# Patient Record
Sex: MALE | Race: WHITE | NOT HISPANIC OR LATINO | Employment: OTHER | URBAN - METROPOLITAN AREA
[De-identification: names, ages, dates, MRNs, and addresses within clinical notes are randomized per-mention and may not be internally consistent; named-entity substitution may affect disease eponyms.]

---

## 2017-08-10 ENCOUNTER — TELEPHONE (OUTPATIENT)
Dept: FAMILY MEDICINE | Facility: CLINIC | Age: 77
End: 2017-08-10

## 2017-08-10 DIAGNOSIS — I10 BENIGN ESSENTIAL HYPERTENSION: Primary | ICD-10-CM

## 2017-08-10 RX ORDER — METOPROLOL SUCCINATE 50 MG/1
50 TABLET, EXTENDED RELEASE ORAL DAILY
Qty: 90 TABLET | Refills: 3 | Status: SHIPPED | OUTPATIENT
Start: 2017-08-10 | End: 2018-04-05

## 2017-08-10 NOTE — TELEPHONE ENCOUNTER
Reason for Call:  Medication or medication refill:    Do you use a [facility]?  Name of the pharmacy and phone number for the current request:    [facility] [address] [place] [address] & [facility].    Name of the medication requested: atenolol (TENORMIN) 50 MG tablet    Other request: this medication is on back order.  Pt. Is out of medication, pharmacy said they have reached out to clinic to   Get a script for another medication but have not heard back.  Please send a script for a new medication to replace this medication.    Can we leave a detailed message on this number? YES    Phone number patient can be reached at: Home number on file [phone] (home)    Best Time: ASAP    Call taken on 8/10/2017 at 1:41 PM by Gertrudis Estrella    .

## 2017-08-10 NOTE — TELEPHONE ENCOUNTER
Prescritpion for metoprolol sent  Have him schedule follow up appointment with PCP for blood pressure recheck after medication change

## 2017-10-27 DIAGNOSIS — I10 BENIGN ESSENTIAL HYPERTENSION: ICD-10-CM

## 2017-10-27 RX ORDER — LISINOPRIL AND HYDROCHLOROTHIAZIDE 12.5; 2 MG/1; MG/1
TABLET ORAL
Qty: 30 TABLET | Refills: 0 | Status: SHIPPED | OUTPATIENT
Start: 2017-10-27 | End: 2017-11-22

## 2017-10-27 NOTE — TELEPHONE ENCOUNTER
Medication is being filled for 1 time refill only due to:  Patient needs to be seen because it has been more than one year since last visit.   Due for annual apt.  Tessa Pruitt RN

## 2017-10-30 RX ORDER — LISINOPRIL AND HYDROCHLOROTHIAZIDE 12.5; 2 MG/1; MG/1
TABLET ORAL
Start: 2017-10-30

## 2017-10-30 NOTE — TELEPHONE ENCOUNTER
Pharmacy requesting 90 day supply  Denied  90 day supply not appropriate; due for physical  Guadalupe BRANDON RN

## 2017-11-05 DIAGNOSIS — I10 BENIGN ESSENTIAL HYPERTENSION: ICD-10-CM

## 2017-11-06 NOTE — TELEPHONE ENCOUNTER
Spoke with patient. Cannot be seen until end of Jan. Scheduled physical for Mon Jan 29 at 8am. Patient requesting 3 months of rx to last until appointment      LOV: 10/28/2016

## 2017-11-07 DIAGNOSIS — I10 BENIGN ESSENTIAL HYPERTENSION: ICD-10-CM

## 2017-11-07 RX ORDER — ATENOLOL 50 MG/1
TABLET ORAL
Qty: 60 TABLET | Refills: 0 | Status: SHIPPED | OUTPATIENT
Start: 2017-11-07 | End: 2018-01-04

## 2017-11-09 RX ORDER — ATENOLOL 50 MG/1
TABLET ORAL
Start: 2017-11-09

## 2017-11-09 NOTE — TELEPHONE ENCOUNTER
Pharmacy requesting 90 day supply  Rx sent 11/7/2017 for 30 day  Denied  90 day supply not appropriate - due for appt  Guadalupe BRANDON RN

## 2017-11-22 DIAGNOSIS — I10 BENIGN ESSENTIAL HYPERTENSION: ICD-10-CM

## 2017-11-22 RX ORDER — LISINOPRIL AND HYDROCHLOROTHIAZIDE 12.5; 2 MG/1; MG/1
TABLET ORAL
Qty: 90 TABLET | Refills: 0 | Status: SHIPPED | OUTPATIENT
Start: 2017-11-22 | End: 2018-02-19

## 2017-11-22 NOTE — TELEPHONE ENCOUNTER
Requested Prescriptions   Pending Prescriptions Disp Refills     lisinopril-hydrochlorothiazide (PRINZIDE/ZESTORETIC) 20-12.5 MG per tablet [Pharmacy Med Name: LISINOPRIL-HCTZ 20/12.5MG TABLETS] 30 tablet 0     Sig: TAKE 1 TABLET BY MOUTH DAILY    Diuretics (Including Combos) Protocol Failed    11/22/2017  1:35 PM       Failed - Blood pressure under 140/90    BP Readings from Last 3 Encounters:   10/28/16 138/82   09/11/15 138/86   05/22/14 156/89          Failed - Recent or future visit with authorizing provider's specialty    Patient had office visit in the last year or has a visit in the next 30 days with authorizing provider.  See chart review.          Failed - Normal serum creatinine on file in past 12 months    Recent Labs   Lab Test  10/28/16   1052   CR  0.82             Failed - Normal serum potassium on file in past 12 months    Recent Labs   Lab Test  10/28/16   1052   POTASSIUM  3.7          Failed - Normal serum sodium on file in past 12 months    Recent Labs   Lab Test  10/28/16   1052   NA  136           Passed - Patient is age 18 or older        Next 5 appointments (look out 90 days)     Jan 29, 2018  8:00 AM CST   [facility]    [address] [facility] [address] [phone]                Medication is being filled for 1 time refill only due to:  Patient needs to be seen because it has been more than one year since last visit.   Aimee LAMAR RN

## 2018-01-04 DIAGNOSIS — I10 BENIGN ESSENTIAL HYPERTENSION: ICD-10-CM

## 2018-01-05 RX ORDER — ATENOLOL 50 MG/1
TABLET ORAL
Qty: 60 TABLET | Refills: 0 | Status: SHIPPED | OUTPATIENT
Start: 2018-01-05 | End: 2018-02-05

## 2018-01-05 NOTE — TELEPHONE ENCOUNTER
Medication is being filled for 1 time refill only due to:  upcoming appt   Guadalupe BRANDON RN    Requested Prescriptions   Pending Prescriptions Disp Refills     atenolol (TENORMIN) 50 MG tablet [Pharmacy Med Name: ATENOLOL 50MG TABLETS] 60 tablet 0     Sig: TAKE 1 TABLET BY MOUTH TWICE DAILY    Beta-Blockers Protocol Failed    1/4/2018 11:46 AM       Failed - Blood pressure under 140/90    BP Readings from Last 3 Encounters:   10/28/16 138/82   09/11/15 138/86   05/22/14 156/89                Passed - Patient is age 6 or older       Passed - Recent or future visit with authorizing provider's specialty    Patient had office visit in the last year or has a visit in the next 30 days with authorizing provider.  See chart review.               Next 5 appointments (look out 90 days)     Jan 29, 2018  8:00 AM CST   PHYSICAL with Madi Huynh MD   [facility]    [address] [facility] [id number] [phone]

## 2018-02-05 DIAGNOSIS — I10 BENIGN ESSENTIAL HYPERTENSION: ICD-10-CM

## 2018-02-05 NOTE — TELEPHONE ENCOUNTER
"atenolol (TENORMIN) 50 MG tablet 60 tablet 0 1/5/2018         Last Written Prescription Date:  01/05/2018  Last Fill Quantity: 60,  # refills: 0   Last Office Visit with [facility] provider:  10/28/2016   Future Office Visit:  04/05/2018  Next 5 appointments (look out 90 days)     Apr 05, 2018 11:00 AM CDT   PHYSICAL with Madi Huynh MD   [facility]    [address] [facility] [id number] [phone]                 Requested Prescriptions   Pending Prescriptions Disp Refills     atenolol (TENORMIN) 50 MG tablet [Pharmacy Med Name: ATENOLOL 50MG TABLETS] 60 tablet 0     Sig: TAKE 1 TABLET BY MOUTH TWICE DAILY    Beta-Blockers Protocol Failed    2/5/2018 12:34 PM       Failed - Blood pressure under 140/90    BP Readings from Last 3 Encounters:   10/28/16 138/82   09/11/15 138/86   05/22/14 156/89                Failed - Recent or future visit with authorizing provider's specialty    Patient had office visit in the last year or has a visit in the next 30 days with authorizing provider.  See \"Patient Info\" tab in inbasket, or \"Choose Columns\" in Meds & Orders section of the refill encounter.            Passed - Patient is age 6 or older          "

## 2018-02-06 RX ORDER — ATENOLOL 50 MG/1
TABLET ORAL
Qty: 60 TABLET | Refills: 0 | Status: SHIPPED | OUTPATIENT
Start: 2018-02-06 | End: 2018-03-08

## 2018-02-06 NOTE — TELEPHONE ENCOUNTER
Routing refill request to provider for review/approval because:  Jodie given x1 and patient did not follow up, please advise  Patient needs to be seen because it has been more than 1 year since last office visit.  Pt does have appt in April

## 2018-02-19 DIAGNOSIS — I10 BENIGN ESSENTIAL HYPERTENSION: ICD-10-CM

## 2018-02-19 NOTE — TELEPHONE ENCOUNTER
"  lisinopril-hydrochlorothiazide (PRINZIDE/ZESTORETIC) 20-12.5 MG per tablet 90 tablet 0 11/22/2017       Last Written Prescription Date:  11/22/2017  Last Fill Quantity: 90,  # refills: 0   Last office visit: 10/28/2016 with prescribing provider:     Future Office Visit: 04/05/2018  Next 5 appointments (look out 90 days)     Apr 05, 2018 11:00 AM CDT   PHYSICAL with Madi Huynh MD   [facility]    [address] [facility] [address]   [phone]                 Requested Prescriptions   Pending Prescriptions Disp Refills     lisinopril-hydrochlorothiazide (PRINZIDE/ZESTORETIC) 20-12.5 MG per tablet [Pharmacy Med Name: LISINOPRIL-HCTZ 20/12.5MG TABLETS] 90 tablet 0     Sig: TAKE ONE TABLET BY MOUTH EVERY DAY.    Diuretics (Including Combos) Protocol Failed    2/19/2018  8:23 AM       Failed - Recent or future visit with authorizing provider's specialty    Patient had office visit in the last year or has a visit in the next 30 days with authorizing provider.  See \"Patient Info\" tab in inbasket, or \"Choose Columns\" in Meds & Orders section of the refill encounter.            Failed - Normal serum creatinine on file in past 12 months    Recent Labs   Lab Test  10/28/16   1052   CR  0.82             Failed - Normal serum potassium on file in past 12 months    Recent Labs   Lab Test  10/28/16   1052   POTASSIUM  3.7                   Failed - Normal serum sodium on file in past 12 months    Recent Labs   Lab Test  10/28/16   1052   NA  136             Passed - Blood pressure under 140/90 in past 12 months    BP Readings from Last 3 Encounters:   10/28/16 138/82   09/11/15 138/86   05/22/14 156/89                Passed - Patient is age 18 or older          "

## 2018-02-20 RX ORDER — LISINOPRIL AND HYDROCHLOROTHIAZIDE 12.5; 2 MG/1; MG/1
TABLET ORAL
Qty: 90 TABLET | Refills: 0 | Status: SHIPPED | OUTPATIENT
Start: 2018-02-20 | End: 2018-04-05

## 2018-02-20 NOTE — TELEPHONE ENCOUNTER
Prescription approved per [facility].  Future OV 4/5.  Dena Bai RN

## 2018-03-16 DIAGNOSIS — E11.42 DM TYPE 2 WITH DIABETIC PERIPHERAL NEUROPATHY (H): ICD-10-CM

## 2018-03-16 RX ORDER — METFORMIN HCL 500 MG
TABLET, EXTENDED RELEASE 24 HR ORAL
Qty: 60 TABLET | Refills: 0 | Status: SHIPPED | OUTPATIENT
Start: 2018-03-16 | End: 2018-03-16

## 2018-03-16 NOTE — TELEPHONE ENCOUNTER
metFORMIN (GLUCOPHAGE-XR) 500 MG 24 hr tablet 180 tablet 3 10/28/2016     Last Written Prescription Date:  10/28/17  Last Fill Quantity: 180,  # refills: 3   Last office visit: 10/28/2016 with prescribing provider:  Aimna   Future Office Visit:   Next 5 appointments (look out 90 days)     Apr 05, 2018 11:00 AM CDT   PHYSICAL with Madi Huynh MD   [facility] ([facility])    [address] [facility] [address] [phone]                 Requested Prescriptions   Pending Prescriptions Disp Refills     metFORMIN (GLUCOPHAGE-XR) 500 MG 24 hr tablet [Pharmacy Med Name: METFORMIN ER 500MG 24HR TABS] 180 tablet 0     Sig: TAKE 2 TABLETS BY MOUTH DAILY WITH BREAKFAST    Biguanide Agents Failed    3/16/2018 12:04 PM       Failed - Blood pressure less than 140/90 in past 6 months    BP Readings from Last 3 Encounters:   10/28/16 138/82   09/11/15 138/86   05/22/14 156/89                Failed - Patient has documented LDL within the past 12 mos.    Recent Labs   Lab Test  10/28/16   1052   LDL  96            Failed - Patient has had a Microalbumin in the past 12 mos.    Recent Labs   Lab Test  10/28/16   1052   MICROL  692   UMALCR  397.70*            Failed - Patient has documented A1c within the specified period of time.    Recent Labs   Lab Test  10/28/16   1052   A1C  5.6            Passed - Patient is age 10 or older       Passed - Patient's CR is NOT>1.4 OR Patient's EGFR is NOT<45 within past 12 mos.    Recent Labs   Lab Test  10/28/16   1052   GFRESTIMATED  >90  Non  GFR Calc     GFRESTBLACK  >90   GFR Calc         Recent Labs   Lab Test  10/28/16   1052   CR  0.82            Passed - Patient does NOT have a diagnosis of CHF.       Passed - Recent (6 mo) or future (30 days) visit within the authorizing provider's specialty    Patient had office visit in the last 6 months or has a visit in the next 30 days with authorizing provider or within  "the authorizing provider's specialty.  See \"Patient Info\" tab in inbasket, or \"Choose Columns\" in Meds & Orders section of the refill encounter.            No flowsheet data found.  "

## 2018-03-16 NOTE — TELEPHONE ENCOUNTER
Medication is being filled for 1 time refill only due to:  Patient needs to be seen because it has been more than one year since last visit.     Has upcoming appointment with PCP scheduled.  Refilled until appointment.     Yuly NOYOLA RN,BSN

## 2018-03-25 ENCOUNTER — NURSE TRIAGE (OUTPATIENT)
Dept: NURSING | Facility: CLINIC | Age: 78
End: 2018-03-25

## 2018-03-25 NOTE — TELEPHONE ENCOUNTER
----- Message from Denys Amaro sent at 3/25/2018  1:01 PM CDT -----  Reason for call:  Patient reporting a symptom    Symptom or request: Sinus infection    Duration (how long have symptoms been present): 3-4 days.    Have you been treated for this before? Yes    Additional comments: Has had sinus infection for over 3-4 days and getting worse.    Phone Number patient can be reached at:  Home number on file [phone] (home)    Best Time:  Anytime.    Can we leave a detailed message on this number:  YES    Call taken on 3/25/2018 at 1:00 PM by Denys Amaro

## 2018-03-25 NOTE — TELEPHONE ENCOUNTER
Spouse states pt believes he has a sinus infection and would like an antibiotic.  Swelling/redness of the cheeks, sore teeth, congestion x 3-4 days.  Denies fever, cough, shortness of breath.      1:12PM: Per spouse's request, writer paged on-call and pcp Dr. Madi Huynh to [phone].      Pharmacy: [facility]    1:20PM: Dr. Huynh called back, gave orders to call in a Z-esvin to the patient's pharmacy.  Writer will do this now.  Spouse is aware and did not have any further questions.       Reason for Disposition    [1] Redness or swelling on the cheek, forehead or around the eye AND [2] no fever    Additional Information    Negative: Severe difficulty breathing (e.g., struggling for each breath, speaks in single words)    Negative: Sounds like a life-threatening emergency to the triager    Negative: [1] Sinus infection AND [2] taking an antibiotic AND [3] symptoms continue    Negative: [1] Difficulty breathing AND [2] not from stuffy nose (e.g., not relieved by cleaning out the nose)    Negative: [1] SEVERE headache AND [2] fever    Negative: [1] Redness or swelling on the cheek, forehead or around the eye AND [2] fever    Negative: Fever > 104 F (40 C)    Negative: Patient sounds very sick or weak to the triager    Negative: [1] SEVERE pain AND [2] not improved 2 hours after pain medicine    Protocols used: SINUS PAIN OR CONGESTION-ADULT-

## 2018-03-28 DIAGNOSIS — E11.42 DM TYPE 2 WITH DIABETIC PERIPHERAL NEUROPATHY (H): ICD-10-CM

## 2018-03-28 NOTE — TELEPHONE ENCOUNTER
metFORMIN (GLUCOPHAGE-XR) 500 MG 24 hr tablet 60 tablet 0 3/19/2018     Last Written Prescription Date:  3/19/18  Last Fill Quantity: 60,  # refills: 0   Last office visit: 10/28/2016 with prescribing provider:  Amina   Future Office Visit:   Next 5 appointments (look out 90 days)     Apr 05, 2018 11:00 AM CDT   Office Visit with Madi Huynh MD   [facility]    [address] [facility] [address] [phone]                 Requested Prescriptions   Pending Prescriptions Disp Refills     metFORMIN (GLUCOPHAGE-XR) 500 MG 24 hr tablet [Pharmacy Med Name: METFORMIN ER 500MG 24HR TABS] 360 tablet 0     Sig: TAKE 2 TABLET BY MOUTH TWICE DAILY WITH BREAKFAST    Biguanide Agents Failed    3/28/2018  9:08 AM       Failed - Blood pressure less than 140/90 in past 6 months    BP Readings from Last 3 Encounters:   10/28/16 138/82   09/11/15 138/86   05/22/14 156/89                Failed - Patient has documented LDL within the past 12 mos.    Recent Labs   Lab Test  10/28/16   1052   LDL  96            Failed - Patient has had a Microalbumin in the past 12 mos.    Recent Labs   Lab Test  10/28/16   1052   MICROL  692   UMALCR  397.70*            Failed - Patient has documented A1c within the specified period of time.    Recent Labs   Lab Test  10/28/16   1052   A1C  5.6            Passed - Patient is age 10 or older       Passed - Patient's CR is NOT>1.4 OR Patient's EGFR is NOT<45 within past 12 mos.    Recent Labs   Lab Test  10/28/16   1052   GFRESTIMATED  >90  Non  GFR Calc     GFRESTBLACK  >90   GFR Calc         Recent Labs   Lab Test  10/28/16   1052   CR  0.82            Passed - Patient does NOT have a diagnosis of CHF.       Passed - Recent (6 mo) or future (30 days) visit within the authorizing provider's specialty    Patient had office visit in the last 6 months or has a visit in the next 30 days with authorizing provider or  "within the authorizing provider's specialty.  See \"Patient Info\" tab in inbasket, or \"Choose Columns\" in Meds & Orders section of the refill encounter.            No flowsheet data found.  "

## 2018-03-29 RX ORDER — METFORMIN HCL 500 MG
TABLET, EXTENDED RELEASE 24 HR ORAL
OUTPATIENT
Start: 2018-03-29

## 2018-03-29 NOTE — TELEPHONE ENCOUNTER
Rx refused. Duplicate request. Pharmacy notified 90 day request not appropriate - pt overdue for fasting appointment as last seen almost 1.5 years ago   Aimee LAMAR RN

## 2018-04-02 NOTE — PROGRESS NOTES
SUBJECTIVE:   Cezar Lockhart is a 77 year old male who presents for Preventive Visit.    The patient is here with his wife, he does not want to undress today for the exam.    He has diabetes and due to gassiness and good a1c I lowered the metformin to 1 a day last office visit.  Somewhat better since then but occasionally still has gassiness, no focal pain or n/v, no fevers, chills or night sweats or weight loss.    He does not check his sugar, not up to date eye exam. Has chronic jt pains and stiffness and has refused rheum eval for this, but now basically using 2 canes to walk and does not sit down due to it.    Recently had sinusitis, off zpak now and better, if lies down in bed for a second light headed but then fine.  No chest pain or shortness of breath.    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     Social History Main Topics     Smoking status: Former Smoker     Quit date: 11/21/1995     Smokeless tobacco: Current User     Types: Chew     Alcohol use 0.0 oz/week     0 Standard drinks or equivalent per week      Comment: 1 drink yearly     Drug use: No     Sexual activity: Yes     Partners: Female     Other Topics Concern     Not on file     Social History Narrative              Family History:   reviewed         Allergies:     Allergies   Allergen Reactions     No Known Allergies              Medications:     Current Outpatient Prescriptions   Medication Sig Dispense Refill     metFORMIN (GLUCOPHAGE-XR) 500 MG 24 hr tablet TAKE 1 TABLETS BY MOUTH DAILY WITH BREAKFAST 90 tablet 3     atenolol (TENORMIN) 50 MG tablet Take 1 tablet (50 mg) by mouth 2 times daily 180 tablet 3     lisinopril-hydrochlorothiazide (PRINZIDE/ZESTORETIC) 20-12.5 MG per tablet TAKE ONE TABLET BY MOUTH EVERY DAY. 90 tablet 3     simvastatin (ZOCOR) 40 MG tablet Take 1 tablet (40 mg) by mouth At Bedtime 90 tablet 3     LORazepam (ATIVAN) 0.5 MG tablet Take 1 tablet (0.5 mg) by mouth daily as needed for anxiety 20 tablet 0     blood glucose monitoring (ACCU-CHEK MULTICLIX) lancets Use to test blood sugars 2 times daily as directed. 3 Box 6     VITAMIN D, CHOLECALCIFEROL, PO Take by mouth daily       aspirin 81 MG tablet Take  by mouth daily.  3     glucose blood VI test strips strip  100 strip prn     Multiple Vitamin (MULTIVITAMINS PO) Take 1 tablet by mouth daily.       ibuprofen (ADVIL,MOTRIN) 200 MG tablet Take 1 tablet by mouth 2 times daily.       [DISCONTINUED] metFORMIN (GLUCOPHAGE-XR) 500 MG 24 hr tablet TAKE 2 TABLETS BY MOUTH DAILY WITH BREAKFAST (Patient taking differently: TAKE 1 TABLETS BY MOUTH DAILY WITH BREAKFAST) 60 tablet 0     [DISCONTINUED] atenolol (TENORMIN) 50 MG tablet TAKE 1 TABLET BY MOUTH TWICE DAILY 60 tablet 0     [DISCONTINUED] lisinopril-hydrochlorothiazide (PRINZIDE/ZESTORETIC) 20-12.5 MG per tablet TAKE ONE TABLET BY MOUTH EVERY DAY. 90 tablet 0     [DISCONTINUED] simvastatin (ZOCOR) 40 MG tablet Take 1 tablet (40 mg) by mouth At Bedtime 90 tablet 3               Review of Systems:   The 10 point Review of Systems is negative other than noted in the HPI           Physical Exam:   Blood pressure 163/73, pulse 81, temperature 98.1  F (36.7  C), temperature source Oral, height 5'  "10\" (1.778 m), weight 226 lb (102.5 kg), SpO2 96 %.    Exam:  Constitutional: healthy appearing, alert and in no distress  Very limited exam as he would not undress or get on table  His right foot is inverted  Skin that I could see is normal  tms and canals within normal limits  Mouth and neck within normal limits  No supraclavicular, cervical or axillary lymphadenopathy  Lungs clear  cv reglar rate and rhythm, no murmer, rub or gallop  Abdomen non-tender  Per wife no sores         Data:   Labs sent        Assessment:   1. Normal complete physical exam  2. Diabetes, has nephrop, blood pressure high but in past has been fine, he will check it and call if up  3. Gassiness, may be metformin, doubt tumor, colitis, etc, follow up a1c and if ok dc it and see  4. Hypertension, ?control, he will start checking it  5. Chronic jt pains and stiffness  6. Anxiety, ok pn ativan discussed with patient and wife risk of sedation/falls  7. Low vit d  8. Elevated cholesterol, not taking statin, I rec it  9. hcm         Plan:   Refuses shingles shot  Labs  Consider dc metformin  Take zocor  I rec living will      Madi Huynh M.D.            Are you in the first 12 months of your Medicare Part B coverage?  No    Healthy Habits:    Do you get at least three servings of calcium containing foods daily (dairy, green leafy vegetables, etc.)? yes    Amount of exercise or daily activities, outside of work: 0 day(s) per week    Problems taking medications regularly No    Medication side effects: No    Have you had an eye exam in the past two years? no    Do you see a dentist twice per year? no    Do you have sleep apnea, excessive snoring or daytime drowsiness?no      Ability to successfully perform activities of daily living: limited    Home safety:  none identified     Hearing impairment: No    Fall risk:           COGNITIVE SCREEN  1) Repeat 3 items (Banana, Sunrise, Chair)    2) Clock draw:   3) 3 item recall: Recalls 3 objects  Results: " 3 items recalled: COGNITIVE IMPAIRMENT LESS LIKELY    Mini-CogTM Copyright CHIRAG Dodson. Licensed by the author for use in [facility]; reprinted with permission (catherine@[facility]). All rights reserved.                Reviewed and updated as needed this visit by clinical staff         Reviewed and updated as needed this visit by Provider        Social History   Substance Use Topics     Smoking status: Former Smoker     Quit date: 11/21/1995     Smokeless tobacco: Current User     Types: Chew     Alcohol use 0.0 oz/week     0 Standard drinks or equivalent per week      Comment: 1 drink yearly       If you drink alcohol do you typically have >3 drinks per day or >7 drinks per week? No                        Today's PHQ-2 Score:   PHQ-2 ( 1999 Pfizer) 10/28/2016 9/11/2015   Q1: Little interest or pleasure in doing things 0 0   Q2: Feeling down, depressed or hopeless 0 0   PHQ-2 Score 0 0       Do you feel safe in your environment - Yes    Do you have a Health Care Directive?: No: Advance care planning was reviewed with patient; patient declined at this time.    Current providers sharing in care for this patient include:   Patient Care Team:  Madi Huynh MD as PCP - General (Internal Medicine)    The following health maintenance items are reviewed in Epic and correct as of today:  Health Maintenance   Topic Date Due     EYE EXAM Q1 YEAR  07/23/1941     LISSY QUESTIONNAIRE 1 YEAR  07/23/1960     PHQ-9 Q1YR  07/23/1960     COLON CANCER SCREEN (SYSTEM ASSIGNED)  07/23/1992     ADVANCE DIRECTIVE PLANNING Q5 YRS  07/23/1997     AORTIC ANEURYSM SCREENING (SYSTEM ASSIGNED)  07/23/2007     FOOT EXAM Q1 YEAR  05/22/2015     A1C Q6 MO  04/28/2017     CREATININE Q1 YEAR  10/28/2017     FALL RISK ASSESSMENT  10/28/2017     LIPID MONITORING Q1 YEAR  10/28/2017     MICROALBUMIN Q1 YEAR  10/28/2017     INFLUENZA VACCINE (SYSTEM ASSIGNED)  09/01/2018     TSH W/ FREE T4 REFLEX Q2 YEAR  10/28/2018     TETANUS IMMUNIZATION  "(SYSTEM ASSIGNED)  11/20/2022     PNEUMOCOCCAL  Completed       End of Life Planning:  Patient currently has an advanced directive: none, I rec it    COUNSELING:  Reviewed preventive health counseling, as reflected in patient instructions       Regular exercise       Healthy diet/nutrition        Estimated body mass index is 32.5 kg/(m^2) as calculated from the following:    Height as of 10/28/16: 5' 10\" (1.778 m).    Weight as of 10/28/16: 226 lb 8 oz (102.7 kg).       reports that he quit smoking about 22 years ago. His smokeless tobacco use includes Chew.      Appropriate preventive services were discussed with this patient, including applicable screening as appropriate for cardiovascular disease, diabetes, osteopenia/osteoporosis, and glaucoma.  As appropriate for age/gender, discussed screening for colorectal cancer, prostate cancer, breast cancer, and cervical cancer. Checklist reviewing preventive services available has been given to the patient.    Reviewed patients plan of care and provided an AVS. The Basic Care Plan (routine screening as documented in Health Maintenance) for Cezar meets the Care Plan requirement. This Care Plan has been established and reviewed with the Patient and spouse.    Counseling Resources:  ATP IV Guidelines  Pooled Cohorts Equation Calculator  Breast Cancer Risk Calculator  FRAX Risk Assessment  ICSI Preventive Guidelines  Dietary Guidelines for Americans, 2010  [employer]'s MyPlate  ASA Prophylaxis  Lung CA Screening    Madi Huynh MD  [facility]  "

## 2018-04-05 ENCOUNTER — OFFICE VISIT (OUTPATIENT)
Dept: FAMILY MEDICINE | Facility: CLINIC | Age: 78
End: 2018-04-05
Payer: COMMERCIAL

## 2018-04-05 VITALS
WEIGHT: 226 LBS | BODY MASS INDEX: 32.35 KG/M2 | HEART RATE: 81 BPM | OXYGEN SATURATION: 96 % | DIASTOLIC BLOOD PRESSURE: 73 MMHG | TEMPERATURE: 98.1 F | SYSTOLIC BLOOD PRESSURE: 163 MMHG | HEIGHT: 70 IN

## 2018-04-05 DIAGNOSIS — F41.9 ANXIETY: ICD-10-CM

## 2018-04-05 DIAGNOSIS — E78.5 HYPERLIPIDEMIA LDL GOAL <100: ICD-10-CM

## 2018-04-05 DIAGNOSIS — M25.50 CHRONIC JOINT PAIN: ICD-10-CM

## 2018-04-05 DIAGNOSIS — E55.9 VITAMIN D DEFICIENCY: ICD-10-CM

## 2018-04-05 DIAGNOSIS — E08.21 DIABETES MELLITUS DUE TO UNDERLYING CONDITION WITH DIABETIC NEPHROPATHY, WITHOUT LONG-TERM CURRENT USE OF INSULIN (H): ICD-10-CM

## 2018-04-05 DIAGNOSIS — E11.42 DM TYPE 2 WITH DIABETIC PERIPHERAL NEUROPATHY (H): ICD-10-CM

## 2018-04-05 DIAGNOSIS — G89.29 CHRONIC JOINT PAIN: ICD-10-CM

## 2018-04-05 DIAGNOSIS — I10 BENIGN ESSENTIAL HYPERTENSION: ICD-10-CM

## 2018-04-05 DIAGNOSIS — Z00.00 ROUTINE GENERAL MEDICAL EXAMINATION AT A HEALTH CARE FACILITY: Primary | ICD-10-CM

## 2018-04-05 LAB
ALBUMIN SERPL-MCNC: 4.3 G/DL (ref 3.4–5)
ALP SERPL-CCNC: 84 U/L (ref 40–150)
ALT SERPL W P-5'-P-CCNC: 21 U/L (ref 0–70)
ANION GAP SERPL CALCULATED.3IONS-SCNC: 9 MMOL/L (ref 3–14)
AST SERPL W P-5'-P-CCNC: 17 U/L (ref 0–45)
BILIRUB SERPL-MCNC: 0.7 MG/DL (ref 0.2–1.3)
BUN SERPL-MCNC: 19 MG/DL (ref 7–30)
CALCIUM SERPL-MCNC: 9.9 MG/DL (ref 8.5–10.1)
CHLORIDE SERPL-SCNC: 101 MMOL/L (ref 94–109)
CHOLEST SERPL-MCNC: 164 MG/DL
CO2 SERPL-SCNC: 26 MMOL/L (ref 20–32)
CREAT SERPL-MCNC: 0.7 MG/DL (ref 0.66–1.25)
CREAT UR-MCNC: 52 MG/DL
ERYTHROCYTE [DISTWIDTH] IN BLOOD BY AUTOMATED COUNT: 14.7 % (ref 10–15)
GFR SERPL CREATININE-BSD FRML MDRD: >90 ML/MIN/1.7M2
GLUCOSE SERPL-MCNC: 114 MG/DL (ref 70–99)
HBA1C MFR BLD: 6.2 % (ref 0–6.4)
HCT VFR BLD AUTO: 43.5 % (ref 40–53)
HDLC SERPL-MCNC: 40 MG/DL
HGB BLD-MCNC: 14.1 G/DL (ref 13.3–17.7)
LDLC SERPL CALC-MCNC: 99 MG/DL
MCH RBC QN AUTO: 30.7 PG (ref 26.5–33)
MCHC RBC AUTO-ENTMCNC: 32.4 G/DL (ref 31.5–36.5)
MCV RBC AUTO: 95 FL (ref 78–100)
MICROALBUMIN UR-MCNC: 670 MG/L
MICROALBUMIN/CREAT UR: 1288.46 MG/G CR (ref 0–17)
NONHDLC SERPL-MCNC: 124 MG/DL
PLATELET # BLD AUTO: 339 10E9/L (ref 150–450)
POTASSIUM SERPL-SCNC: 4 MMOL/L (ref 3.4–5.3)
PROT SERPL-MCNC: 8.4 G/DL (ref 6.8–8.8)
RBC # BLD AUTO: 4.59 10E12/L (ref 4.4–5.9)
SODIUM SERPL-SCNC: 136 MMOL/L (ref 133–144)
TRIGL SERPL-MCNC: 124 MG/DL
TSH SERPL DL<=0.005 MIU/L-ACNC: 0.8 MU/L (ref 0.4–4)
WBC # BLD AUTO: 10.5 10E9/L (ref 4–11)

## 2018-04-05 PROCEDURE — 83036 HEMOGLOBIN GLYCOSYLATED A1C: CPT | Performed by: INTERNAL MEDICINE

## 2018-04-05 PROCEDURE — 99397 PER PM REEVAL EST PAT 65+ YR: CPT | Performed by: INTERNAL MEDICINE

## 2018-04-05 PROCEDURE — 80061 LIPID PANEL: CPT | Performed by: INTERNAL MEDICINE

## 2018-04-05 PROCEDURE — 80053 COMPREHEN METABOLIC PANEL: CPT | Performed by: INTERNAL MEDICINE

## 2018-04-05 PROCEDURE — 36415 COLL VENOUS BLD VENIPUNCTURE: CPT | Performed by: INTERNAL MEDICINE

## 2018-04-05 PROCEDURE — 85027 COMPLETE CBC AUTOMATED: CPT | Performed by: INTERNAL MEDICINE

## 2018-04-05 PROCEDURE — 84443 ASSAY THYROID STIM HORMONE: CPT | Performed by: INTERNAL MEDICINE

## 2018-04-05 PROCEDURE — 82043 UR ALBUMIN QUANTITATIVE: CPT | Performed by: INTERNAL MEDICINE

## 2018-04-05 RX ORDER — ATENOLOL 50 MG/1
50 TABLET ORAL 2 TIMES DAILY
Qty: 180 TABLET | Refills: 3 | Status: SHIPPED | OUTPATIENT
Start: 2018-04-05 | End: 2019-04-02

## 2018-04-05 RX ORDER — METFORMIN HCL 500 MG
TABLET, EXTENDED RELEASE 24 HR ORAL
Qty: 90 TABLET | Refills: 3 | Status: SHIPPED | OUTPATIENT
Start: 2018-04-05 | End: 2019-06-27

## 2018-04-05 RX ORDER — SIMVASTATIN 40 MG
40 TABLET ORAL AT BEDTIME
Qty: 90 TABLET | Refills: 3 | Status: SHIPPED | OUTPATIENT
Start: 2018-04-05 | End: 2019-06-27

## 2018-04-05 RX ORDER — LISINOPRIL AND HYDROCHLOROTHIAZIDE 12.5; 2 MG/1; MG/1
TABLET ORAL
Qty: 90 TABLET | Refills: 3 | Status: SHIPPED | OUTPATIENT
Start: 2018-04-05 | End: 2019-05-28

## 2018-04-05 RX ORDER — LORAZEPAM 0.5 MG/1
0.5 TABLET ORAL DAILY PRN
Qty: 20 TABLET | Refills: 0 | Status: SHIPPED | OUTPATIENT
Start: 2018-04-05 | End: 2023-06-01

## 2018-04-05 NOTE — MR AVS SNAPSHOT
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   o   Discuss preventive care.    o   Review your medicines if your doctor has prescribed any.    Talk with your health care provider about whether you should have a test to screen for prostate cancer (PSA).    Every 3 years, have a diabetes test (fasting glucose). If you are at risk for diabetes, you should have this test more often.    Every 5 years, have a cholesterol test. Have this test more often if you are at risk for high cholesterol or heart disease.     Every 10 years, have a colonoscopy. Or, have a yearly FIT test (stool test). These exams will check for colon cancer.    Talk to with your health care provider about screening for Abdominal Aortic Aneurysm if you have a family history of AAA or have a history of smoking.  Shots:     Get a flu shot each year.     Get a tetanus shot every 10 years.     Talk to your doctor about your pneumonia vaccines. There are now two you should receive - Pneumovax (PPSV 23) and Prevnar (PCV 13).    Talk to your doctor about a shingles vaccine.     Talk to your doctor about the hepatitis B  "vaccine.  Nutrition:     Eat at least 5 servings of fruits and vegetables each day.     Eat whole-grain bread, whole-wheat pasta and brown rice instead of white grains and rice.     Talk to your doctor about Calcium and Vitamin D.   Lifestyle    Exercise for at least 150 minutes a week (30 minutes a day, 5 days a week). This will help you control your weight and prevent disease.     Limit alcohol to one drink per day.     No smoking.     Wear sunscreen to prevent skin cancer.     See your dentist every six months for an exam and cleaning.     See your eye doctor every 1 to 2 years to screen for conditions such as glaucoma, macular degeneration and cataracts.          Follow-ups after your visit        Who to contact     If you have questions or need follow up information about today's clinic visit or your schedule please contact [facility] directly at [phone].  Normal or non-critical lab and imaging results will be communicated to you by MyChart, letter or phone within 4 business days after the clinic has received the results. If you do not hear from us within 7 days, please contact the clinic through [employer] or phone. If you have a critical or abnormal lab result, we will notify you by phone as soon as possible.  Submit refill requests through [employer] or call your pharmacy and they will forward the refill request to us. Please allow 3 business days for your refill to be completed.          Additional Information About Your Visit        [employer] Information     [employer] lets you send messages to your doctor, view your test results, renew your prescriptions, schedule appointments and more. To sign up, go to [email or web address] . Click on \"Log in\" on the left side of the screen, which will take you to the Welcome page. Then click on \"Sign up Now\" on the right side of the page.     You will be asked to enter the access code listed below, as well as some personal information. Please follow the " "directions to create your username and password.     Your access code is: [email or web address]  Expires: 2018 11:16 AM     Your access code will  in 90 days. If you need help or a new code, please call your [facility] clinic or [phone].        Care EveryWhere ID     This is your Care EveryWhere ID. This could be used by other organizations to access your [facility] medical records  [id number]        Your Vitals Were     Pulse Temperature Height Pulse Oximetry BMI (Body Mass Index)       81 98.1  F (36.7  C) (Oral) 5' 10\" (1.778 m) 96% 32.43 kg/m2        Blood Pressure from Last 3 Encounters:   18 163/73   10/28/16 138/82   09/11/15 138/86    Weight from Last 3 Encounters:   18 226 lb (102.5 kg)   10/28/16 226 lb 8 oz (102.7 kg)   09/11/15 231 lb (104.8 kg)              We Performed the Following     Albumin Random Urine Quantitative with Creat Ratio     CBC with platelets     Comprehensive metabolic panel     Hemoglobin A1c     Lipid panel reflex to direct LDL Non-fasting     TSH with free T4 reflex          Today's Medication Changes          These changes are accurate as of 18 11:16 AM.  If you have any questions, ask your nurse or doctor.               Start taking these medicines.        Dose/Directions    LORazepam 0.5 MG tablet   Commonly known as:  ATIVAN   Used for:  Anxiety   Started by:  Madi Huynh MD        Dose:  0.5 mg   Take 1 tablet (0.5 mg) by mouth daily as needed for anxiety   Quantity:  20 tablet   Refills:  0         These medicines have changed or have updated prescriptions.        Dose/Directions    atenolol 50 MG tablet   Commonly known as:  TENORMIN   This may have changed:  See the new instructions.   Used for:  Benign essential hypertension   Changed by:  Madi Huynh MD        Dose:  50 mg   Take 1 tablet (50 mg) by mouth 2 times daily   Quantity:  180 tablet   Refills:  3       lisinopril-hydrochlorothiazide 20-12.5 MG per tablet   Commonly known " as:  PRINZIDE/ZESTORETIC   This may have changed:  See the new instructions.   Used for:  Benign essential hypertension   Changed by:  Madi Huynh MD        TAKE ONE TABLET BY MOUTH EVERY DAY.   Quantity:  90 tablet   Refills:  3       metFORMIN 500 MG 24 hr tablet   Commonly known as:  GLUCOPHAGE-XR   This may have changed:  See the new instructions.   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 Please contact your care team if you have questions.     metoprolol succinate 50 MG 24 hr tablet   Commonly known as:  TOPROL-XL   Stopped by:  Madi Huynh MD                Where to get your medicines      [facility] [address] [place] [address] & [address] [address]     Phone:  [phone]     atenolol 50 MG tablet    lisinopril-hydrochlorothiazide 20-12.5 MG per tablet    metFORMIN 500 MG 24 hr tablet    simvastatin 40 MG tablet         Some of these will need a paper prescription and others can be bought over the counter.  Ask your nurse if you have questions.     Bring a paper prescription for each of these medications     LORazepam 0.5 MG tablet                Primary Care Provider Office Phone # Fax #    Madi Huynh [phone] [address] [address]        Equal Access to Services     [facility] : Hadii aad ku hadasho Soomaali, waaxda luqadaha, qaybta kaalmada adeegyada, waxay shannan vickers. So [facility] [phone].    ATENCIÓN: Si habla español, tiene a crump disposición servicios gratuitos de asistencia lingüística. Aba ruffin [phone].    We comply with applicable federal civil rights laws and [place] laws. We  do not discriminate on the basis of race, color, national origin, age, disability, sex, sexual orientation, or gender identity.            Thank you!     Thank you for choosing [facility]  for your care. Our goal is always to provide you with excellent care. Hearing back from our patients is one way we can continue to improve our services. Please take a few minutes to complete the written survey that you may receive in the mail after your visit with us. Thank you!             Your Updated Medication List - Protect others around you: Learn how to safely use, store and throw away your medicines at www.disposemymeds.org.          This list is accurate as of 4/5/18 11:16 AM.  Always use your most recent med list.                   Brand Name Dispense Instructions for use Diagnosis    aspirin 81 MG tablet      Take  by mouth daily.    DM (diabetes mellitus) (H)       atenolol 50 MG tablet    TENORMIN    180 tablet    Take 1 tablet (50 mg) by mouth 2 times daily    Benign essential hypertension       blood glucose monitoring lancets     3 Box    Use to test blood sugars 2 times daily as directed.    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        lisinopril-hydrochlorothiazide 20-12.5 MG per tablet    PRINZIDE/ZESTORETIC    90 tablet    TAKE ONE TABLET BY MOUTH EVERY DAY.    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        simvastatin 40 MG tablet    ZOCOR    90 tablet     Take 1 tablet (40 mg) by mouth At Bedtime    Hyperlipidemia LDL goal <100       VITAMIN D (CHOLECALCIFEROL) PO      Take by mouth daily

## 2018-04-05 NOTE — LETTER
[facility]  [address] [facility]  [address]  [place]  [address]  Tel: [phone]    April 6, 2018    Cezar Lockhart  [address]  [facility] [address]        Dear Mr. Lockhart,    It was a pleasure seeing you.  I wanted to get back to you with your test results.  I have enclosed a copy for your records.     For the most part your labs look very good.  This includes your complete blood count, blood salts, kidney tests, liver tests, proteins and thyroid test.  Your diabetes test or a1c is just a bit higher this year.  However, I still would suggest that you stop the metformin completely to see if the gassiness goes away.  It is important to let me know how this works after you do this.  Please be sure to call me about 3 to 4 weeks later and let me know how your symptoms are.  I then want to have you follow up and repeat the lab in 4 months.     You have a bit more protein in the urine this time.  I will want to keep an eye on this as well but please be sure to monitor your blood pressure and if it is not around 130/80 let me know.     If you have any questions please call me.  Please call me in 3 weeks with an update off the metformin.  Also, please take the simvastatin.       Sincerely,    Madi Huynh MD/tigre    Results for orders placed or performed in visit on 04/05/18   CBC with platelets   Result Value Ref Range    WBC 10.5 4.0 - 11.0 10e9/L    RBC Count 4.59 4.4 - 5.9 10e12/L    Hemoglobin 14.1 13.3 - 17.7 g/dL    Hematocrit 43.5 40.0 - 53.0 %    MCV 95 78 - 100 fl    MCH 30.7 26.5 - 33.0 pg    MCHC 32.4 31.5 - 36.5 g/dL    RDW 14.7 10.0 - 15.0 %    Platelet Count 339 150 - 450 10e9/L   Comprehensive metabolic panel   Result Value Ref Range    Sodium 136 133 - 144 mmol/L    Potassium 4.0 3.4 - 5.3 mmol/L    Chloride 101 94 - 109 mmol/L    Carbon Dioxide 26 20 - 32 mmol/L    Anion Gap 9 3 - 14 mmol/L    Glucose 114 (H) 70 - 99 mg/dL    Urea Nitrogen 19 7 - 30 mg/dL    Creatinine 0.70 0.66 -  1.25 mg/dL    GFR Estimate >90 >60 mL/min/1.7m2    GFR Estimate If Black >90 >60 mL/min/1.7m2    Calcium 9.9 8.5 - 10.1 mg/dL    Bilirubin Total 0.7 0.2 - 1.3 mg/dL    Albumin 4.3 3.4 - 5.0 g/dL    Protein Total 8.4 6.8 - 8.8 g/dL    Alkaline Phosphatase 84 40 - 150 U/L    ALT 21 0 - 70 U/L    AST 17 0 - 45 U/L   Lipid panel reflex to direct LDL Non-fasting   Result Value Ref Range    Cholesterol 164 <200 mg/dL    Triglycerides 124 <150 mg/dL    HDL Cholesterol 40 >39 mg/dL    LDL Cholesterol Calculated 99 <100 mg/dL    Non HDL Cholesterol 124 <130 mg/dL   Hemoglobin A1c   Result Value Ref Range    Hemoglobin A1C 6.2 0 - 6.4 %   Albumin Random Urine Quantitative with Creat Ratio   Result Value Ref Range    Creatinine Urine 52 mg/dL    Albumin Urine mg/L 670 mg/L    Albumin Urine mg/g Cr 1288.46 (H) 0 - 17 mg/g Cr   TSH with free T4 reflex   Result Value Ref Range    TSH 0.80 0.40 - 4.00 mU/L           Enclosure: Lab Results

## 2018-04-05 NOTE — NURSING NOTE
"Chief Complaint   Patient presents with     Medicare Visit       Initial /73  Pulse 81  Temp 98.1  F (36.7  C) (Oral)  Ht 5' 10\" (1.778 m)  Wt 226 lb (102.5 kg)  SpO2 96%  BMI 32.43 kg/m2 Estimated body mass index is 32.43 kg/(m^2) as calculated from the following:    Height as of this encounter: 5' 10\" (1.778 m).    Weight as of this encounter: 226 lb (102.5 kg).  Medication Reconciliation: complete   Fatmata Tinoco CMA      "

## 2018-04-06 NOTE — PROGRESS NOTES
It was a pleasure seeing you.  I wanted to get back to you with your test results.  I have enclosed a copy for your records.    For the most part your labs look very good.  This includes your complete blood count, blood salts, kidney tests, liver tests, proteins and thyroid test.  Your diabetes test or a1c is just a bit higher this year.  However, I still would suggest that you stop the metformin completely to see if the gassiness goes away.  It is important to let me know how this works after you do this.  Please be sure to call me about 3 to 4 weeks later and let me know how your symptoms are.  I then want to have you follow up and repeat the lab in 4 months.    You have a bit more protein in the urine this time.  I will want to keep an eye on this as well but please be sure to monitor your blood pressure and if it is not around 130/80 let me know.    If you have any questions please call me.  Please call me in 3 weeks with an update off the metformin.  Also, please take the simvastatin.

## 2018-05-25 DIAGNOSIS — I10 BENIGN ESSENTIAL HYPERTENSION: ICD-10-CM

## 2018-05-25 RX ORDER — LISINOPRIL AND HYDROCHLOROTHIAZIDE 12.5; 2 MG/1; MG/1
TABLET ORAL
Qty: 90 TABLET | Refills: 0 | OUTPATIENT
Start: 2018-05-25

## 2018-05-25 NOTE — TELEPHONE ENCOUNTER
"Requested Prescriptions   Pending Prescriptions Disp Refills     lisinopril-hydrochlorothiazide (PRINZIDE/ZESTORETIC) 20-12.5 MG per tablet [Pharmacy Med Name: LISINOPRIL-HCTZ 20/12.5MG TABLETS]  Last Written Prescription Date:  04/05/2018  Last Fill Quantity: 90 tablet,  # refills: 3   Last Office Visit: 4/5/2018   Future Office Visit:    90 tablet 0     Sig: TAKE ONE TABLET BY MOUTH EVERY DAY.    Diuretics (Including Combos) Protocol Failed    5/25/2018  3:06 AM       Failed - Blood pressure under 140/90 in past 12 months    BP Readings from Last 3 Encounters:   04/05/18 163/73   10/28/16 138/82   09/11/15 138/86                Passed - Recent (12 mo) or future (30 days) visit within the authorizing provider's specialty    Patient had office visit in the last 12 months or has a visit in the next 30 days with authorizing provider or within the authorizing provider's specialty.  See \"Patient Info\" tab in inbasket, or \"Choose Columns\" in Meds & Orders section of the refill encounter.           Passed - Patient is age 18 or older       Passed - Normal serum creatinine on file in past 12 months    Recent Labs   Lab Test  04/05/18   1114   CR  0.70             Passed - Normal serum potassium on file in past 12 months    Recent Labs   Lab Test  04/05/18   1114   POTASSIUM  4.0                   Passed - Normal serum sodium on file in past 12 months    Recent Labs   Lab Test  04/05/18   1114   NA  136                "

## 2019-04-02 DIAGNOSIS — I10 BENIGN ESSENTIAL HYPERTENSION: ICD-10-CM

## 2019-04-02 NOTE — TELEPHONE ENCOUNTER
"Last Written Prescription Date:  4/05/18  Last Fill Quantity: 180 tablet,  # refills: 3   Last office visit: 4/5/2018 with prescribing provider:  Amina   Future Office Visit:      Requested Prescriptions   Pending Prescriptions Disp Refills     atenolol (TENORMIN) 50 MG tablet [Pharmacy Med Name: ATENOLOL 50MG TABLETS] 180 tablet 0     Sig: TAKE 1 TABLET(50 MG) BY MOUTH TWICE DAILY    Beta-Blockers Protocol Failed - 4/2/2019  3:06 AM       Failed - Blood pressure under 140/90 in past 12 months    BP Readings from Last 3 Encounters:   04/05/18 163/73   10/28/16 138/82   09/11/15 138/86                Passed - Patient is age 6 or older       Passed - Recent (12 mo) or future (30 days) visit within the authorizing provider's specialty    Patient had office visit in the last 12 months or has a visit in the next 30 days with authorizing provider or within the authorizing provider's specialty.  See \"Patient Info\" tab in inbasket, or \"Choose Columns\" in Meds & Orders section of the refill encounter.             Passed - Medication is active on med list          "

## 2019-04-04 RX ORDER — ATENOLOL 50 MG/1
TABLET ORAL
Qty: 60 TABLET | Refills: 0 | Status: SHIPPED | OUTPATIENT
Start: 2019-04-04 | End: 2019-05-08

## 2019-04-04 NOTE — TELEPHONE ENCOUNTER
Routing refill request to provider for review/approval because:  BP not current/ in range    04/05/18 163/73   10/28/16 138/82   09/11/15 138/86     Please review and authorize if appropriate.    Thank you,   Bennett CRENSHAW RN

## 2019-05-08 DIAGNOSIS — I10 BENIGN ESSENTIAL HYPERTENSION: ICD-10-CM

## 2019-05-08 NOTE — TELEPHONE ENCOUNTER
"atenolol (TENORMIN) 50 MG tablet 60 tablet 0 4/4/2019     Last Written Prescription Date:  4/4/19  Last Fill Quantity: 60,  # refills: 0   Last office visit: 4/5/2018 with prescribing provider:  Amina   Future Office Visit:   Next 5 appointments (look out 90 days)    Jun 27, 2019 11:00 AM CDT  PHYSICAL with Madi Huynh MD  [facility] [address] [address] [phone]         Requested Prescriptions   Pending Prescriptions Disp Refills     atenolol (TENORMIN) 50 MG tablet [Pharmacy Med Name: ATENOLOL 50MG TABLETS] 60 tablet 0     Sig: TAKE 1 TABLET BY MOUTH TWICE DAILY.       Beta-Blockers Protocol Failed - 5/8/2019 11:36 AM        Failed - Blood pressure under 140/90 in past 12 months     BP Readings from Last 3 Encounters:   04/05/18 163/73   10/28/16 138/82   09/11/15 138/86                 Failed - Recent (12 mo) or future (30 days) visit within the authorizing provider's specialty     Patient had office visit in the last 12 months or has a visit in the next 30 days with authorizing provider or within the authorizing provider's specialty.  See \"Patient Info\" tab in inbasket, or \"Choose Columns\" in Meds & Orders section of the refill encounter.              Passed - Patient is age 6 or older        Passed - Medication is active on med list        No flowsheet data found.    "

## 2019-05-09 RX ORDER — ATENOLOL 50 MG/1
TABLET ORAL
Qty: 60 TABLET | Refills: 0 | Status: SHIPPED | OUTPATIENT
Start: 2019-05-09 | End: 2019-06-04

## 2019-05-09 NOTE — TELEPHONE ENCOUNTER
Medication is being filled for 1 time refill only due to:  Patient needs to be seen because it has been more than one year since last visit.  Aimee LAMAR RN

## 2019-05-28 DIAGNOSIS — I10 BENIGN ESSENTIAL HYPERTENSION: ICD-10-CM

## 2019-05-28 NOTE — TELEPHONE ENCOUNTER
"lisinopril-hydrochlorothiazide (PRINZIDE/ZESTORETIC) 20-12.5 MG per tablet 90 tablet 3 4/5/2018     Last Written Prescription Date:  4/5/18  Last Fill Quantity: 90,  # refills: 3   Last office visit: 4/5/2018 with prescribing provider:  Amina   Future Office Visit:   Next 5 appointments (look out 90 days)    Jun 27, 2019 11:00 AM CDT  PHYSICAL with Madi Huynh MD  [facility] ([facility] [address] [address]  [phone]         Requested Prescriptions   Pending Prescriptions Disp Refills     lisinopril-hydrochlorothiazide (PRINZIDE/ZESTORETIC) 20-12.5 MG tablet [Pharmacy Med Name: LISINOPRIL-HCTZ 20/12.5MG TABLETS] 90 tablet 0     Sig: TAKE 1 TABLET BY MOUTH EVERY DAY       Diuretics (Including Combos) Protocol Failed - 5/28/2019  7:58 AM        Failed - Blood pressure under 140/90 in past 12 months     BP Readings from Last 3 Encounters:   04/05/18 163/73   10/28/16 138/82   09/11/15 138/86                 Failed - Recent (12 mo) or future (30 days) visit within the authorizing provider's specialty     Patient had office visit in the last 12 months or has a visit in the next 30 days with authorizing provider or within the authorizing provider's specialty.  See \"Patient Info\" tab in inbasket, or \"Choose Columns\" in Meds & Orders section of the refill encounter.              Failed - Normal serum creatinine on file in past 12 months     Recent Labs   Lab Test 04/05/18  1114   CR 0.70              Failed - Normal serum potassium on file in past 12 months     Recent Labs   Lab Test 04/05/18  1114   POTASSIUM 4.0                    Failed - Normal serum sodium on file in past 12 months     Recent Labs   Lab Test 04/05/18  1114                 Passed - Medication is active on med list        Passed - Patient is age 18 or older        No flowsheet data found.    "

## 2019-05-29 DIAGNOSIS — I10 BENIGN ESSENTIAL HYPERTENSION: ICD-10-CM

## 2019-05-29 RX ORDER — LISINOPRIL AND HYDROCHLOROTHIAZIDE 12.5; 2 MG/1; MG/1
TABLET ORAL
Qty: 30 TABLET | Refills: 0 | Status: SHIPPED | OUTPATIENT
Start: 2019-05-29 | End: 2019-05-29

## 2019-05-29 NOTE — TELEPHONE ENCOUNTER
"lisinopril-hydrochlorothiazide (PRINZIDE/ZESTORETIC) 20-12.5 MG tablet    Last Written Prescription Date:  05/29/2019  Last Fill Quantity: 30,  # refills: 0   Last office visit: 4/5/2018 with prescribing provider:  Amina   Future Office Visit:   Next 5 appointments (look out 90 days)    Jun 27, 2019 11:00 AM CDT  PHYSICAL with Madi Huynh MD  [facility] ([facility]) [address] [facility] [id number] [phone]           Requested Prescriptions   Pending Prescriptions Disp Refills     lisinopril-hydrochlorothiazide (PRINZIDE/ZESTORETIC) 20-12.5 MG tablet [Pharmacy Med Name: LISINOPRIL-HCTZ 20/12.5MG TABLETS] 90 tablet 0     Sig: TAKE 1 TABLET BY MOUTH EVERY DAY       Diuretics (Including Combos) Protocol Failed - 5/29/2019  3:24 PM        Failed - Blood pressure under 140/90 in past 12 months     BP Readings from Last 3 Encounters:   04/05/18 163/73   10/28/16 138/82   09/11/15 138/86                 Failed - Normal serum creatinine on file in past 12 months     Recent Labs   Lab Test 04/05/18  1114   CR 0.70              Failed - Normal serum potassium on file in past 12 months     Recent Labs   Lab Test 04/05/18  1114   POTASSIUM 4.0                    Failed - Normal serum sodium on file in past 12 months     Recent Labs   Lab Test 04/05/18  1114                 Passed - Recent (12 mo) or future (30 days) visit within the authorizing provider's specialty     Patient had office visit in the last 12 months or has a visit in the next 30 days with authorizing provider or within the authorizing provider's specialty.  See \"Patient Info\" tab in inbasket, or \"Choose Columns\" in Meds & Orders section of the refill encounter.              Passed - Medication is active on med list        Passed - Patient is age 18 or older          "

## 2019-05-29 NOTE — TELEPHONE ENCOUNTER
Pt has OV scheduled for 6/27.  Refilled #30 with note to pharmacy to inform patient to discuss refills at upcoming Appt.     Cherelle MORATAYA RN

## 2019-05-30 NOTE — TELEPHONE ENCOUNTER
Patient's wife, Madhuri, called regarding this prescription.    She just spoke with the pharmacy and they have not received a prescription.  Also wondering if they can get the full refill of 90 days?  The cost is too much for 30 days.    Patient does have a px scheduled for 6/27.    Please call with questions/concerns  [phone]  OK to leave VM

## 2019-05-31 RX ORDER — LISINOPRIL AND HYDROCHLOROTHIAZIDE 12.5; 2 MG/1; MG/1
TABLET ORAL
Qty: 90 TABLET | Refills: 0 | Status: SHIPPED | OUTPATIENT
Start: 2019-05-31 | End: 2019-06-27

## 2019-06-04 DIAGNOSIS — I10 BENIGN ESSENTIAL HYPERTENSION: ICD-10-CM

## 2019-06-04 NOTE — TELEPHONE ENCOUNTER
"  atenolol (TENORMIN) 50 MG tablet 60 tablet 0 5/9/2019         Last Written Prescription Date:  05/09/2019  Last Fill Quantity: 60,  # refills: 0   Last office visit: 4/5/2018 with prescribing provider:     Future Office Visit:   Next 5 appointments (look out 90 days)    Jun 27, 2019 11:00 AM CDT  PHYSICAL with Madi Huynh MD  [facility] ([facility] [address] [id number] [phone]         Requested Prescriptions   Pending Prescriptions Disp Refills     atenolol (TENORMIN) 50 MG tablet [Pharmacy Med Name: ATENOLOL 50MG TABLETS] 60 tablet 0     Sig: TAKE 1 TABLET BY MOUTH TWICE DAILY.       Beta-Blockers Protocol Failed - 6/4/2019  5:45 PM        Failed - Blood pressure under 140/90 in past 12 months     BP Readings from Last 3 Encounters:   04/05/18 163/73   10/28/16 138/82   09/11/15 138/86                 Passed - Patient is age 6 or older        Passed - Recent (12 mo) or future (30 days) visit within the authorizing provider's specialty     Patient had office visit in the last 12 months or has a visit in the next 30 days with authorizing provider or within the authorizing provider's specialty.  See \"Patient Info\" tab in inbasket, or \"Choose Columns\" in Meds & Orders section of the refill encounter.              Passed - Medication is active on med list          "

## 2019-06-05 RX ORDER — ATENOLOL 50 MG/1
TABLET ORAL
Qty: 180 TABLET | Refills: 0 | Status: SHIPPED | OUTPATIENT
Start: 2019-06-05 | End: 2019-06-27

## 2019-06-05 NOTE — TELEPHONE ENCOUNTER
Routing refill request to provider for review/approval because:  Last BP elevated    Aimee LAMAR RN

## 2019-06-27 ENCOUNTER — OFFICE VISIT (OUTPATIENT)
Dept: FAMILY MEDICINE | Facility: CLINIC | Age: 79
End: 2019-06-27
Payer: COMMERCIAL

## 2019-06-27 VITALS
HEIGHT: 70 IN | OXYGEN SATURATION: 96 % | DIASTOLIC BLOOD PRESSURE: 86 MMHG | SYSTOLIC BLOOD PRESSURE: 172 MMHG | TEMPERATURE: 98.3 F | HEART RATE: 83 BPM | BODY MASS INDEX: 32.43 KG/M2

## 2019-06-27 DIAGNOSIS — M25.50 CHRONIC JOINT PAIN: ICD-10-CM

## 2019-06-27 DIAGNOSIS — G62.9 PERIPHERAL POLYNEUROPATHY: ICD-10-CM

## 2019-06-27 DIAGNOSIS — E78.5 HYPERLIPIDEMIA LDL GOAL <100: ICD-10-CM

## 2019-06-27 DIAGNOSIS — E08.21 DIABETES MELLITUS DUE TO UNDERLYING CONDITION WITH DIABETIC NEPHROPATHY, WITHOUT LONG-TERM CURRENT USE OF INSULIN (H): Primary | ICD-10-CM

## 2019-06-27 DIAGNOSIS — E11.42 DM TYPE 2 WITH DIABETIC PERIPHERAL NEUROPATHY (H): ICD-10-CM

## 2019-06-27 DIAGNOSIS — I10 BENIGN ESSENTIAL HYPERTENSION: ICD-10-CM

## 2019-06-27 DIAGNOSIS — G89.29 CHRONIC JOINT PAIN: ICD-10-CM

## 2019-06-27 DIAGNOSIS — E55.9 VITAMIN D DEFICIENCY: ICD-10-CM

## 2019-06-27 LAB
ALBUMIN SERPL-MCNC: 3.9 G/DL (ref 3.4–5)
ALP SERPL-CCNC: 91 U/L (ref 40–150)
ALT SERPL W P-5'-P-CCNC: 27 U/L (ref 0–70)
ANION GAP SERPL CALCULATED.3IONS-SCNC: 11 MMOL/L (ref 3–14)
AST SERPL W P-5'-P-CCNC: 19 U/L (ref 0–45)
BILIRUB SERPL-MCNC: 0.6 MG/DL (ref 0.2–1.3)
BUN SERPL-MCNC: 22 MG/DL (ref 7–30)
CALCIUM SERPL-MCNC: 9.2 MG/DL (ref 8.5–10.1)
CHLORIDE SERPL-SCNC: 104 MMOL/L (ref 94–109)
CHOLEST SERPL-MCNC: 170 MG/DL
CO2 SERPL-SCNC: 21 MMOL/L (ref 20–32)
CREAT SERPL-MCNC: 0.74 MG/DL (ref 0.66–1.25)
CREAT UR-MCNC: 73 MG/DL
DEPRECATED CALCIDIOL+CALCIFEROL SERPL-MC: 53 UG/L (ref 20–75)
ERYTHROCYTE [DISTWIDTH] IN BLOOD BY AUTOMATED COUNT: 15.1 % (ref 10–15)
GFR SERPL CREATININE-BSD FRML MDRD: 89 ML/MIN/{1.73_M2}
GLUCOSE SERPL-MCNC: 151 MG/DL (ref 70–99)
HBA1C MFR BLD: 7 % (ref 0–5.6)
HCT VFR BLD AUTO: 41.5 % (ref 40–53)
HDLC SERPL-MCNC: 36 MG/DL
HGB BLD-MCNC: 13.7 G/DL (ref 13.3–17.7)
LDLC SERPL CALC-MCNC: 104 MG/DL
MCH RBC QN AUTO: 30.6 PG (ref 26.5–33)
MCHC RBC AUTO-ENTMCNC: 33 G/DL (ref 31.5–36.5)
MCV RBC AUTO: 93 FL (ref 78–100)
MICROALBUMIN UR-MCNC: 670 MG/L
MICROALBUMIN/CREAT UR: 916.55 MG/G CR (ref 0–17)
NONHDLC SERPL-MCNC: 134 MG/DL
PLATELET # BLD AUTO: 367 10E9/L (ref 150–450)
POTASSIUM SERPL-SCNC: 4.1 MMOL/L (ref 3.4–5.3)
PROT SERPL-MCNC: 9 G/DL (ref 6.8–8.8)
RBC # BLD AUTO: 4.48 10E12/L (ref 4.4–5.9)
SODIUM SERPL-SCNC: 136 MMOL/L (ref 133–144)
TRIGL SERPL-MCNC: 150 MG/DL
TSH SERPL DL<=0.005 MIU/L-ACNC: 0.82 MU/L (ref 0.4–4)
WBC # BLD AUTO: 11.5 10E9/L (ref 4–11)

## 2019-06-27 PROCEDURE — 36415 COLL VENOUS BLD VENIPUNCTURE: CPT | Performed by: INTERNAL MEDICINE

## 2019-06-27 PROCEDURE — 80061 LIPID PANEL: CPT | Performed by: INTERNAL MEDICINE

## 2019-06-27 PROCEDURE — 84165 PROTEIN E-PHORESIS SERUM: CPT | Performed by: INTERNAL MEDICINE

## 2019-06-27 PROCEDURE — 85027 COMPLETE CBC AUTOMATED: CPT | Performed by: INTERNAL MEDICINE

## 2019-06-27 PROCEDURE — 82306 VITAMIN D 25 HYDROXY: CPT | Performed by: INTERNAL MEDICINE

## 2019-06-27 PROCEDURE — 99214 OFFICE O/P EST MOD 30 MIN: CPT | Performed by: INTERNAL MEDICINE

## 2019-06-27 PROCEDURE — 84443 ASSAY THYROID STIM HORMONE: CPT | Performed by: INTERNAL MEDICINE

## 2019-06-27 PROCEDURE — 80053 COMPREHEN METABOLIC PANEL: CPT | Performed by: INTERNAL MEDICINE

## 2019-06-27 PROCEDURE — 83036 HEMOGLOBIN GLYCOSYLATED A1C: CPT | Performed by: INTERNAL MEDICINE

## 2019-06-27 PROCEDURE — 82043 UR ALBUMIN QUANTITATIVE: CPT | Performed by: INTERNAL MEDICINE

## 2019-06-27 RX ORDER — METFORMIN HCL 500 MG
TABLET, EXTENDED RELEASE 24 HR ORAL
Qty: 90 TABLET | Refills: 3 | Status: CANCELLED | OUTPATIENT
Start: 2019-06-27

## 2019-06-27 RX ORDER — SIMVASTATIN 20 MG
20 TABLET ORAL AT BEDTIME
Qty: 90 TABLET | Refills: 3 | Status: SHIPPED | OUTPATIENT
Start: 2019-06-27 | End: 2020-06-26

## 2019-06-27 RX ORDER — SIMVASTATIN 40 MG
40 TABLET ORAL AT BEDTIME
Qty: 90 TABLET | Refills: 3 | Status: CANCELLED | OUTPATIENT
Start: 2019-06-27

## 2019-06-27 RX ORDER — ATENOLOL 50 MG/1
50 TABLET ORAL 2 TIMES DAILY
Qty: 180 TABLET | Refills: 3 | Status: SHIPPED | OUTPATIENT
Start: 2019-06-27 | End: 2020-06-26

## 2019-06-27 RX ORDER — LISINOPRIL AND HYDROCHLOROTHIAZIDE 12.5; 2 MG/1; MG/1
1 TABLET ORAL DAILY
Qty: 90 TABLET | Refills: 3 | Status: SHIPPED | OUTPATIENT
Start: 2019-06-27 | End: 2020-06-24

## 2019-06-27 NOTE — LETTER
[facility]  [address] [facility]  [address]  [place]  [address]  Tel: [phone]    July 8, 2019    Cezar Lockhart  [address]  [facility] [address]        Dear Mr. Lockhart,    It was a pleasure seeing you.  I wanted to get back to you with your test results.  I have enclosed a copy for your records.    Overall the labs look ok but there are a few things a bit off.  Your hemoglobin a1c or diabetes test is higher.  I would suggest you go back on the metformin to lower it as it did not seem to help the gassiness to be off it.  I can send that to your pharmacy.    Your cholesterol is a bit high and you have more protein in the urine so it is important to get your blood pressure down.  I think we should add another blood pressure medication to do this.  I would like to send something called amlodipine to your pharmacy and let's have you start taking this in addition to your other medications.  Most people have no side effects but if you do let me know.  The most common is swelling in the legs so if this worsens let me know.    Your chemistries, thyroid, vitamin D level and blood count are all fine.    If you have any questions please call me.  Don't forget to see me yearly.    If you have any further questions or problems, please contact our office.      Sincerely,    Madi Huynh MD/NIKKI          Enclosure: Lab Results  Results for orders placed or performed in visit on 06/27/19   Vitamin D Deficiency   Result Value Ref Range    Vitamin D Deficiency screening 53 20 - 75 ug/L   TSH with free T4 reflex   Result Value Ref Range    TSH 0.82 0.40 - 4.00 mU/L   Albumin Random Urine Quantitative with Creat Ratio   Result Value Ref Range    Creatinine Urine 73 mg/dL    Albumin Urine mg/L 670 mg/L    Albumin Urine mg/g Cr 916.55 (H) 0 - 17 mg/g Cr   Hemoglobin A1c   Result Value Ref Range    Hemoglobin A1C 7.0 (H) 0 - 5.6 %   Lipid panel reflex to direct LDL Non-fasting   Result Value Ref Range     Cholesterol 170 <200 mg/dL    Triglycerides 150 (H) <150 mg/dL    HDL Cholesterol 36 (L) >39 mg/dL    LDL Cholesterol Calculated 104 (H) <100 mg/dL    Non HDL Cholesterol 134 (H) <130 mg/dL   Comprehensive metabolic panel   Result Value Ref Range    Sodium 136 133 - 144 mmol/L    Potassium 4.1 3.4 - 5.3 mmol/L    Chloride 104 94 - 109 mmol/L    Carbon Dioxide 21 20 - 32 mmol/L    Anion Gap 11 3 - 14 mmol/L    Glucose 151 (H) 70 - 99 mg/dL    Urea Nitrogen 22 7 - 30 mg/dL    Creatinine 0.74 0.66 - 1.25 mg/dL    GFR Estimate 89 >60 mL/min/[1.73_m2]    GFR Estimate If Black >90 >60 mL/min/[1.73_m2]    Calcium 9.2 8.5 - 10.1 mg/dL    Bilirubin Total 0.6 0.2 - 1.3 mg/dL    Albumin 3.9 3.4 - 5.0 g/dL    Protein Total 9.0 (H) 6.8 - 8.8 g/dL    Alkaline Phosphatase 91 40 - 150 U/L    ALT 27 0 - 70 U/L    AST 19 0 - 45 U/L   CBC with platelets   Result Value Ref Range    WBC 11.5 (H) 4.0 - 11.0 10e9/L    RBC Count 4.48 4.4 - 5.9 10e12/L    Hemoglobin 13.7 13.3 - 17.7 g/dL    Hematocrit 41.5 40.0 - 53.0 %    MCV 93 78 - 100 fl    MCH 30.6 26.5 - 33.0 pg    MCHC 33.0 31.5 - 36.5 g/dL    RDW 15.1 (H) 10.0 - 15.0 %    Platelet Count 367 150 - 450 10e9/L   Protein electrophoresis   Result Value Ref Range    Albumin Fraction 4.3 3.7 - 5.1 g/dL    Alpha 1 Fraction 0.4 0.2 - 0.4 g/dL    Alpha 2 Fraction 1.0 (H) 0.5 - 0.9 g/dL    Beta Fraction 1.0 0.6 - 1.0 g/dL    Gamma Fraction 2.0 (H) 0.7 - 1.6 g/dL    Monoclonal Peak 0.0 0.0 g/dL    ELP Interpretation:       Slightly increased beta fraction. Polyclonal increase in the gamma fraction, no monoclonal   protein seen. This pattern is seen in a variety of inflammatory disorders. Pathologic   significance requires clinical correlation. GRIS You M.D., Pathologist.

## 2019-06-27 NOTE — PROGRESS NOTES
The patient presents with his wife for follow-up of multiple issues.    The patient has chronic back pain as well as pain in his ankles and feet due to degenerative degenerative arthritis.  As noted that multiple prior visits he is unable to sit down because of his back pain.  In bed at night he says it is brought in his wife lifts his feet to get him into bed and then moves his feet over.  He is however able to get out of bed on his own.  He really does not go out because of his chronic pain and inability to sit.  Because of this he does have some degeneration in his feet as well although this is long-standing.  His wife checks his feet and there are no sores.    With respect to his diabetes he went off his metformin as I recommended last year due to gassiness but this did not improve and he never called me back.  He does not check his sugars.  He is not up-to-date in terms of his eye exam.    The patient has hypertension and his blood pressure today is quite high.  At home he does check it and it has been okay.    He otherwise feels fine.  He has no other complaints.    In terms of an examination he does not want to take off any of his close.  He did not want attempt to sit or lie for the exam.    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)      Fracture, hip (H) young     HTN (hypertension)      Peripheral neuropathy      Vitamin D deficiency      Past Surgical History:   Procedure Laterality Date     APPENDECTOMY OPEN       HERNIA REPAIR       Social History     Socioeconomic History     Marital status:      Spouse name: Not on file     Number of children: Not on file     Years of education: Not on file     Highest education level: Not on file   Occupational History     Not on file   Social Needs     Financial resource strain: Not on file     Food  insecurity:     Worry: Not on file     Inability: Not on file     Transportation needs:     Medical: Not on file     Non-medical: Not on file   Tobacco Use     Smoking status: Former Smoker     Last attempt to quit: 1995     Years since quittin.6     Smokeless tobacco: Current User     Types: Chew   Substance and Sexual Activity     Alcohol use: Yes     Alcohol/week: 0.0 oz     Comment: 1 drink yearly     Drug use: No     Sexual activity: Yes     Partners: Female   Lifestyle     Physical activity:     Days per week: Not on file     Minutes per session: Not on file     Stress: Not on file   Relationships     Social connections:     Talks on phone: Not on file     Gets together: Not on file     Attends Spiritism service: Not on file     Active member of club or organization: Not on file     Attends meetings of clubs or organizations: Not on file     Relationship status: Not on file     Intimate partner violence:     Fear of current or ex partner: Not on file     Emotionally abused: Not on file     Physically abused: Not on file     Forced sexual activity: Not on file   Other Topics Concern     Parent/sibling w/ CABG, MI or angioplasty before 65F 55M? Not Asked   Social History Narrative     Not on file     Current Outpatient Medications   Medication Sig Dispense Refill     aspirin 81 MG tablet Take  by mouth daily.  3     atenolol (TENORMIN) 50 MG tablet Take 1 tablet (50 mg) by mouth 2 times daily 180 tablet 3     blood glucose monitoring (ACCU-CHEK MULTICLIX) lancets Use to test blood sugars 2 times daily as directed. 3 Box 6     glucose blood VI test strips strip  100 strip prn     ibuprofen (ADVIL,MOTRIN) 200 MG tablet Take 1 tablet by mouth 2 times daily.       lisinopril-hydrochlorothiazide (PRINZIDE/ZESTORETIC) 20-12.5 MG tablet Take 1 tablet by mouth daily 90 tablet 3     LORazepam (ATIVAN) 0.5 MG tablet Take 1 tablet (0.5 mg) by mouth daily as needed for anxiety 20 tablet 0     Multiple Vitamin  "(MULTIVITAMINS PO) Take 1 tablet by mouth daily.       simvastatin (ZOCOR) 20 MG tablet Take 1 tablet (20 mg) by mouth At Bedtime 90 tablet 3     VITAMIN D, CHOLECALCIFEROL, PO Take by mouth daily       Allergies   Allergen Reactions     No Known Allergies      FAMILY HISTORY NOTED AND REVIEWED    REVIEW OF SYSTEMS: above    PHYSICAL EXAM    /86   Pulse 83   Temp 98.3  F (36.8  C) (Oral)   Ht 1.778 m (5' 10\")   SpO2 96%   BMI 32.43 kg/m      Patient appears non toxic  Lungs clear  cv reglar rate and rhythm  Abdomen non-tender    Labs sent    ASSESSMENT:  1. Chronic jt pain, with chronic low back pain and jt deformities of feet and ankle, I once again explained to the patient that he has very significant joint issues and I am not qualified to deal with these.  I strongly recommended rheumatology.  I explained to him that is his feet and ankle worse and he is at very high risk for ulceration which can lead to serious infections or even loss of limb and he understands this but refuses follow-up.  I also discussed with him seeing a mobile care clinic for another health system as we do not have one and I strongly recommended he explore this.  2. Diabetes with c/o, follow up labs, I rec eye exam  3. Hypertension, control ok at home  4. Neuropathy    PLAN:  Labs now  shingrix at pharm  Above rec to patient and wife    Madi Huynh M.D.        "

## 2019-07-03 LAB
ALBUMIN SERPL ELPH-MCNC: 4.3 G/DL (ref 3.7–5.1)
ALPHA1 GLOB SERPL ELPH-MCNC: 0.4 G/DL (ref 0.2–0.4)
ALPHA2 GLOB SERPL ELPH-MCNC: 1 G/DL (ref 0.5–0.9)
B-GLOBULIN SERPL ELPH-MCNC: 1 G/DL (ref 0.6–1)
GAMMA GLOB SERPL ELPH-MCNC: 2 G/DL (ref 0.7–1.6)
M PROTEIN SERPL ELPH-MCNC: 0 G/DL
PROT PATTERN SERPL ELPH-IMP: ABNORMAL

## 2019-07-07 RX ORDER — AMLODIPINE BESYLATE 2.5 MG/1
2.5 TABLET ORAL DAILY
Qty: 90 TABLET | Refills: 3 | Status: SHIPPED | OUTPATIENT
Start: 2019-07-07 | End: 2020-06-26

## 2019-07-07 NOTE — RESULT ENCOUNTER NOTE
It was a pleasure seeing you.  I wanted to get back to you with your test results.  I have enclosed a copy for your records.    Overall the labs look ok but there are a few things a bit off.  Your hemoglobin a1c or diabetes test is higher.  I would suggest you go back on the metformin to lower it as it did not seem to help the gassiness to be off it.  I can send that to your pharmacy.    Your cholesterol is a bit high and you have more protein in the urine so it is important to get your blood pressure down.  I think we should add another blood pressure medication to do this.  I would like to send something called amlodipine to your pharmacy and let's have you start taking this in addition to your other medications.  Most people have no side effects but if you do let me know.  The most common is swelling in the legs so if this worsens let me know.    Your chemistries, thyroid, vitamin D level and blood count are all fine.    If you have any questions please call me.  Don't forget to see me yearly.

## 2019-09-06 DIAGNOSIS — I10 BENIGN ESSENTIAL HYPERTENSION: ICD-10-CM

## 2019-09-06 RX ORDER — ATENOLOL 50 MG/1
TABLET ORAL
Qty: 180 TABLET | Refills: 0 | OUTPATIENT
Start: 2019-09-06

## 2019-09-06 NOTE — TELEPHONE ENCOUNTER
"Requested Prescriptions   Pending Prescriptions Disp Refills     atenolol (TENORMIN) 50 MG tablet [Pharmacy Med Name: ATENOLOL 50MG TABLETS] 180 tablet 0     Sig: TAKE ONE TABLET BY MOUTH TWICE DAILY.   Last Written Prescription Date:  6/27/2019  Last Fill Quantity: 180,  # refills: 3   Last office visit: 6/27/2019 with prescribing provider:  Amina   Future Office Visit:        Beta-Blockers Protocol Failed - 9/6/2019  9:13 AM        Failed - Blood pressure under 140/90 in past 12 months     BP Readings from Last 3 Encounters:   06/27/19 172/86   04/05/18 163/73   10/28/16 138/82                 Passed - Patient is age 6 or older        Passed - Recent (12 mo) or future (30 days) visit within the authorizing provider's specialty     Patient had office visit in the last 12 months or has a visit in the next 30 days with authorizing provider or within the authorizing provider's specialty.  See \"Patient Info\" tab in inbasket, or \"Choose Columns\" in Meds & Orders section of the refill encounter.              Passed - Medication is active on med list        Patient has refills remaining at pharmacy.  KAN AroraN, RN  [employer] [facility]      "

## 2020-06-18 DIAGNOSIS — I10 BENIGN ESSENTIAL HYPERTENSION: ICD-10-CM

## 2020-06-19 NOTE — TELEPHONE ENCOUNTER
: Please schedule patient for Virtual Visit. Document if does not have enough med to get to this appointment. Then--route back to triage so we can notify pharmacy and either deny or refill the request.   Thank you,  Yuly NOYOLA RN,BSN

## 2020-06-24 RX ORDER — LISINOPRIL AND HYDROCHLOROTHIAZIDE 12.5; 2 MG/1; MG/1
1 TABLET ORAL DAILY
Qty: 90 TABLET | Refills: 0 | Status: SHIPPED | OUTPATIENT
Start: 2020-06-24 | End: 2020-06-26

## 2020-06-26 ENCOUNTER — VIRTUAL VISIT (OUTPATIENT)
Dept: FAMILY MEDICINE | Facility: CLINIC | Age: 80
End: 2020-06-26
Payer: COMMERCIAL

## 2020-06-26 DIAGNOSIS — E78.5 HYPERLIPIDEMIA LDL GOAL <100: ICD-10-CM

## 2020-06-26 DIAGNOSIS — I10 BENIGN ESSENTIAL HYPERTENSION: Primary | ICD-10-CM

## 2020-06-26 DIAGNOSIS — E55.9 VITAMIN D DEFICIENCY: ICD-10-CM

## 2020-06-26 DIAGNOSIS — E11.42 DM TYPE 2 WITH DIABETIC PERIPHERAL NEUROPATHY (H): ICD-10-CM

## 2020-06-26 DIAGNOSIS — G62.9 PERIPHERAL POLYNEUROPATHY: ICD-10-CM

## 2020-06-26 PROCEDURE — 99214 OFFICE O/P EST MOD 30 MIN: CPT | Mod: TEL | Performed by: INTERNAL MEDICINE

## 2020-06-26 RX ORDER — SIMVASTATIN 20 MG
20 TABLET ORAL AT BEDTIME
Qty: 90 TABLET | Refills: 3 | Status: SHIPPED | OUTPATIENT
Start: 2020-06-26 | End: 2023-06-01

## 2020-06-26 RX ORDER — ATENOLOL 50 MG/1
50 TABLET ORAL 2 TIMES DAILY
Qty: 180 TABLET | Refills: 3 | Status: SHIPPED | OUTPATIENT
Start: 2020-06-26 | End: 2021-08-27

## 2020-06-26 RX ORDER — LISINOPRIL AND HYDROCHLOROTHIAZIDE 12.5; 2 MG/1; MG/1
1 TABLET ORAL DAILY
Qty: 90 TABLET | Refills: 3 | Status: SHIPPED | OUTPATIENT
Start: 2020-06-26 | End: 2020-09-22

## 2020-06-26 RX ORDER — METFORMIN HCL 500 MG
1000 TABLET, EXTENDED RELEASE 24 HR ORAL
Qty: 180 TABLET | Refills: 3 | Status: SHIPPED | OUTPATIENT
Start: 2020-06-26 | End: 2023-06-01

## 2020-06-26 NOTE — PROGRESS NOTES
"Cezar Lockhart is a 79 year old male who is being evaluated via a billable telephone visit.      The patient has been notified of following:     \"This telephone visit will be conducted via a call between you and your physician/provider. We have found that certain health care needs can be provided without the need for a physical exam.  This service lets us provide the care you need with a short phone conversation.  If a prescription is necessary we can send it directly to your pharmacy.  If lab work is needed we can place an order for that and you can then stop by our lab to have the test done at a later time.    Telephone visits are billed at different rates depending on your insurance coverage. During this emergency period, for some insurers they may be billed the same as an in-person visit.  Please reach out to your insurance provider with any questions.    If during the course of the call the physician/provider feels a telephone visit is not appropriate, you will not be charged for this service.\"    Patient has given verbal consent for Telephone visit?  Yes    What phone number would you like to be contacted at? [phone]    How would you like to obtain your AVS? Mail a copy    Subjective     Cezar Lockhart is a 79 year old male who presents via phone visit today for the following health issues:    The patient is doing well, he has mmp as noted below:    He is isolating with his wife.    1. Follow up diabetes, he takes metformin but sometime skips it if gets gi upset.  No sores on his feet or toes, not up to date eye exam.      2. Follow up hypertension, as noted last year I rec adding norvasc but patient never started it.  He is taking his meds reg.  Not sure what blood pressure is    3. Follow up elevated cholesterol, on zocor    4. 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)      Fracture, hip (H) young     HTN (hypertension)     added norvasc      Peripheral neuropathy      Vitamin D deficiency      Past Surgical History:   Procedure Laterality Date     APPENDECTOMY OPEN       HERNIA REPAIR       Social History     Socioeconomic History     Marital status:      Spouse name: Not on file     Number of children: Not on file     Years of education: Not on file     Highest education level: Not on file   Occupational History     Not on file   Social Needs     Financial resource strain: Not on file     Food insecurity     Worry: Not on file     Inability: Not on file     Transportation needs     Medical: Not on file     Non-medical: Not on file   Tobacco Use     Smoking status: Former Smoker     Last attempt to quit: 1995     Years since quittin.6     Smokeless tobacco: Current User     Types: Chew   Substance and Sexual Activity     Alcohol use: Yes     Alcohol/week: 0.0 standard drinks     Comment: 1 drink yearly     Drug use: No     Sexual activity: Yes     Partners: Female   Lifestyle     Physical activity     Days per week: Not on file     Minutes per session: Not on file     Stress: Not on file   Relationships     Social connections     Talks on phone: Not on file     Gets together: Not on file     Attends [employer]ostal service: Not on file     Active member of club or organization: Not on file     Attends meetings of clubs or organizations: Not on file     Relationship status: Not on file     Intimate partner violence     Fear of current or ex partner: Not on file     Emotionally abused: Not on file     Physically abused: Not on file     Forced sexual activity: Not on file   Other Topics Concern     Parent/sibling w/ CABG, MI or angioplasty before 65F 55M? Not Asked   Social History Narrative     Not on file     Current Outpatient Medications    Medication Sig Dispense Refill     aspirin 81 MG tablet Take  by mouth daily.  3     atenolol (TENORMIN) 50 MG tablet Take 1 tablet (50 mg) by mouth 2 times daily 180 tablet 3     blood glucose monitoring (ACCU-CHEK MULTICLIX) lancets Use to test blood sugars 2 times daily as directed. 3 Box 6     glucose blood VI test strips strip  100 strip prn     ibuprofen (ADVIL,MOTRIN) 200 MG tablet Take 1 tablet by mouth 2 times daily.       lisinopril-hydrochlorothiazide (ZESTORETIC) 20-12.5 MG tablet Take 1 tablet by mouth daily 90 tablet 3     LORazepam (ATIVAN) 0.5 MG tablet Take 1 tablet (0.5 mg) by mouth daily as needed for anxiety 20 tablet 0     metFORMIN (GLUCOPHAGE) 500 MG tablet Take 1 tablet (500 mg) by mouth 2 times daily (with meals) 180 tablet 3     metFORMIN (GLUCOPHAGE-XR) 500 MG 24 hr tablet Take 2 tablets (1,000 mg) by mouth daily (with dinner) 180 tablet 3     Multiple Vitamin (MULTIVITAMINS PO) Take 1 tablet by mouth daily.       simvastatin (ZOCOR) 20 MG tablet Take 1 tablet (20 mg) by mouth At Bedtime 90 tablet 3     VITAMIN D, CHOLECALCIFEROL, PO Take by mouth daily       Allergies   Allergen Reactions     No Known Allergies      FAMILY HISTORY NOTED AND REVIEWED    REVIEW OF SYSTEMS: above    PHYSICAL EXAM    There were no vitals taken for this visit.    ASSESSMENT:  1. Diabetes with c/o, stable, has gi upset, will try ER metformin and call if still has gi issues  2. Hypertension, to check it and let me know if up  3. Elevated cholesterol on statin  4. Neurop, due to diabetes  5. Vit d defic    PLAN:  Change metformin to ER  Follow up for labs, he wants to wait on this for now  Call if problems    Madi Huynh M.D.  Time: 12 minutes

## 2020-08-31 DIAGNOSIS — I10 BENIGN ESSENTIAL HYPERTENSION: ICD-10-CM

## 2020-08-31 RX ORDER — ATENOLOL 50 MG/1
TABLET ORAL
Qty: 180 TABLET | Refills: 3 | OUTPATIENT
Start: 2020-08-31

## 2020-08-31 RX ORDER — ATENOLOL 50 MG/1
TABLET ORAL
Qty: 180 TABLET | Refills: 3 | Status: CANCELLED | OUTPATIENT
Start: 2020-08-31

## 2020-08-31 NOTE — TELEPHONE ENCOUNTER
atenolol (TENORMIN) 50 MG tablet  180 tablet  3  6/26/2020   No    Sig - Route: Take 1 tablet (50 mg) by mouth 2 times daily - Oral    Sent to pharmacy as: Atenolol 50 MG Oral Tablet (TENORMIN)    Class: E-Prescribe    Order: [id number]    E-Prescribing Status: Receipt confirmed by pharmacy (6/26/2020 10:32 AM CDT)    Printout Tracking     External Result Report    Pharmacy     [id number] - [place] [id number] [place] [id number] & [facility]

## 2021-08-27 DIAGNOSIS — I10 BENIGN ESSENTIAL HYPERTENSION: ICD-10-CM

## 2021-08-27 RX ORDER — ATENOLOL 50 MG/1
50 TABLET ORAL 2 TIMES DAILY
Qty: 180 TABLET | Refills: 0 | Status: SHIPPED | OUTPATIENT
Start: 2021-08-27 | End: 2021-10-14

## 2021-08-27 NOTE — TELEPHONE ENCOUNTER
Called and scheduled fasting physical for patient with PCP.   Patient unable to schedule until mid Oct.     Medication is being filled x 90 days until appointment.     Yuly DUKE, RN      August 27, 2021  3:34 PM

## 2021-08-27 NOTE — TELEPHONE ENCOUNTER
LOV 6- [facility]  No future OV scheduled    30 days R0 pended  SIG/pharm note given    TC please call patient to schedule fasting physical (in person)    RT Matthew (R)

## 2021-10-08 DIAGNOSIS — I10 BENIGN ESSENTIAL HYPERTENSION: ICD-10-CM

## 2021-10-08 RX ORDER — LISINOPRIL AND HYDROCHLOROTHIAZIDE 12.5; 2 MG/1; MG/1
1 TABLET ORAL DAILY
Qty: 90 TABLET | Refills: 0 | Status: SHIPPED | OUTPATIENT
Start: 2021-10-08 | End: 2021-10-14

## 2021-10-08 NOTE — TELEPHONE ENCOUNTER
Patient will be out of medication today, labs over due appointment scheduled for 10/14     Medication pended for 90 days

## 2021-10-14 ENCOUNTER — OFFICE VISIT (OUTPATIENT)
Dept: FAMILY MEDICINE | Facility: CLINIC | Age: 81
End: 2021-10-14
Payer: COMMERCIAL

## 2021-10-14 VITALS
DIASTOLIC BLOOD PRESSURE: 88 MMHG | HEIGHT: 71 IN | HEART RATE: 70 BPM | WEIGHT: 226 LBS | SYSTOLIC BLOOD PRESSURE: 164 MMHG | BODY MASS INDEX: 31.64 KG/M2

## 2021-10-14 DIAGNOSIS — E11.42 DM TYPE 2 WITH DIABETIC PERIPHERAL NEUROPATHY (H): ICD-10-CM

## 2021-10-14 DIAGNOSIS — Z23 NEED FOR PROPHYLACTIC VACCINATION AND INOCULATION AGAINST INFLUENZA: ICD-10-CM

## 2021-10-14 DIAGNOSIS — M25.50 CHRONIC JOINT PAIN: ICD-10-CM

## 2021-10-14 DIAGNOSIS — E55.9 VITAMIN D DEFICIENCY: ICD-10-CM

## 2021-10-14 DIAGNOSIS — I10 BENIGN ESSENTIAL HYPERTENSION: ICD-10-CM

## 2021-10-14 DIAGNOSIS — J31.0 CHRONIC RHINITIS: ICD-10-CM

## 2021-10-14 DIAGNOSIS — E78.5 HYPERLIPIDEMIA LDL GOAL <100: ICD-10-CM

## 2021-10-14 DIAGNOSIS — Z00.00 MEDICARE ANNUAL WELLNESS VISIT, SUBSEQUENT: Primary | ICD-10-CM

## 2021-10-14 DIAGNOSIS — G89.29 CHRONIC JOINT PAIN: ICD-10-CM

## 2021-10-14 LAB
DEPRECATED CALCIDIOL+CALCIFEROL SERPL-MC: 62 UG/L (ref 20–75)
ERYTHROCYTE [DISTWIDTH] IN BLOOD BY AUTOMATED COUNT: 14.3 % (ref 10–15)
HCT VFR BLD AUTO: 41.2 % (ref 40–53)
HGB BLD-MCNC: 13.8 G/DL (ref 13.3–17.7)
MCH RBC QN AUTO: 32.3 PG (ref 26.5–33)
MCHC RBC AUTO-ENTMCNC: 33.5 G/DL (ref 31.5–36.5)
MCV RBC AUTO: 97 FL (ref 78–100)
PLATELET # BLD AUTO: 325 10E3/UL (ref 150–450)
RBC # BLD AUTO: 4.27 10E6/UL (ref 4.4–5.9)
WBC # BLD AUTO: 11.3 10E3/UL (ref 4–11)

## 2021-10-14 PROCEDURE — 80061 LIPID PANEL: CPT | Performed by: INTERNAL MEDICINE

## 2021-10-14 PROCEDURE — 99214 OFFICE O/P EST MOD 30 MIN: CPT | Mod: 25 | Performed by: INTERNAL MEDICINE

## 2021-10-14 PROCEDURE — 83036 HEMOGLOBIN GLYCOSYLATED A1C: CPT | Performed by: INTERNAL MEDICINE

## 2021-10-14 PROCEDURE — 80053 COMPREHEN METABOLIC PANEL: CPT | Performed by: INTERNAL MEDICINE

## 2021-10-14 PROCEDURE — G0008 ADMIN INFLUENZA VIRUS VAC: HCPCS | Performed by: INTERNAL MEDICINE

## 2021-10-14 PROCEDURE — 82306 VITAMIN D 25 HYDROXY: CPT | Performed by: INTERNAL MEDICINE

## 2021-10-14 PROCEDURE — 99397 PER PM REEVAL EST PAT 65+ YR: CPT | Mod: 25 | Performed by: INTERNAL MEDICINE

## 2021-10-14 PROCEDURE — 85027 COMPLETE CBC AUTOMATED: CPT | Performed by: INTERNAL MEDICINE

## 2021-10-14 PROCEDURE — 90662 IIV NO PRSV INCREASED AG IM: CPT | Performed by: INTERNAL MEDICINE

## 2021-10-14 PROCEDURE — 36415 COLL VENOUS BLD VENIPUNCTURE: CPT | Performed by: INTERNAL MEDICINE

## 2021-10-14 RX ORDER — AMLODIPINE BESYLATE 5 MG/1
5 TABLET ORAL DAILY
Qty: 90 TABLET | Refills: 3 | Status: SHIPPED | OUTPATIENT
Start: 2021-10-14 | End: 2023-01-06

## 2021-10-14 RX ORDER — ATENOLOL 50 MG/1
50 TABLET ORAL 2 TIMES DAILY
Qty: 180 TABLET | Refills: 3 | Status: SHIPPED | OUTPATIENT
Start: 2021-10-14 | End: 2021-11-29

## 2021-10-14 RX ORDER — IPRATROPIUM BROMIDE 42 UG/1
2 SPRAY, METERED NASAL 3 TIMES DAILY
Qty: 15 ML | Refills: 1 | Status: SHIPPED | OUTPATIENT
Start: 2021-10-14 | End: 2022-12-05

## 2021-10-14 RX ORDER — LISINOPRIL AND HYDROCHLOROTHIAZIDE 12.5; 2 MG/1; MG/1
1 TABLET ORAL DAILY
Qty: 90 TABLET | Refills: 3 | Status: SHIPPED | OUTPATIENT
Start: 2021-10-14 | End: 2023-01-12

## 2021-10-14 ASSESSMENT — MIFFLIN-ST. JEOR: SCORE: 1762.26

## 2021-10-14 ASSESSMENT — ACTIVITIES OF DAILY LIVING (ADL): CURRENT_FUNCTION: NO ASSISTANCE NEEDED

## 2021-10-14 NOTE — LETTER
[facility]  [address] [facility]  [address]  [place]  [address]  Tel: [phone]    October 20, 2021    Cezar Lockhart  [address] [facility] [id number]        Dear Mr. Lockhart,    It was a pleasure seeing you for your physical examination.  I wanted to get back to you with your test results.  I have enclosed a copy for your review.       I am happy to report that your cbc or complete blood count is normal with no signs of anemia, leukemia or platelet abnormalities.  Your chemistry panel shows the slightly elevated blood sugar.  However, the other diabetes test which looks back over 3 months called the hemoglobin A1c is normal.  The bottom line is the numbers overall are fine but please be sure to eat a healthy diet and keep your weight down for this.  Your blood salts, kidney tests, liver tests, vitamin D level,and proteins are all fine.     Your total cholesterol is 152 with the normal range being below 200.  Your HDL or good cholesterol is 34 with the normal range being above 40.  Your LDL or bad cholesterol is 98 with the normal range being below 130.  These numbers are just fine.     I am happy to bring you this overall excellent report.  If you have any questions please call me.       Sincerely,    Madi Huynh MD/NIKKI          Enclosure: Lab Results  Results for orders placed or performed in visit on 10/14/21   CBC with platelets     Status: Abnormal   Result Value Ref Range    WBC Count 11.3 (H) 4.0 - 11.0 10e3/uL    RBC Count 4.27 (L) 4.40 - 5.90 10e6/uL    Hemoglobin 13.8 13.3 - 17.7 g/dL    Hematocrit 41.2 40.0 - 53.0 %    MCV 97 78 - 100 fL    MCH 32.3 26.5 - 33.0 pg    MCHC 33.5 31.5 - 36.5 g/dL    RDW 14.3 10.0 - 15.0 %    Platelet Count 325 150 - 450 10e3/uL   Comprehensive metabolic panel     Status: Abnormal   Result Value Ref Range    Sodium 134 133 - 144 mmol/L    Potassium 4.0 3.4 - 5.3 mmol/L    Chloride 103 94 - 109 mmol/L    Carbon Dioxide (CO2) 21 20 - 32  mmol/L    Anion Gap 10 3 - 14 mmol/L    Urea Nitrogen 31 (H) 7 - 30 mg/dL    Creatinine 0.76 0.66 - 1.25 mg/dL    Calcium 8.8 8.5 - 10.1 mg/dL    Glucose 139 (H) 70 - 99 mg/dL    Alkaline Phosphatase 87 40 - 150 U/L    AST 16 0 - 45 U/L    ALT 23 0 - 70 U/L    Protein Total 8.2 6.8 - 8.8 g/dL    Albumin 3.7 3.4 - 5.0 g/dL    Bilirubin Total 0.8 0.2 - 1.3 mg/dL    GFR Estimate 87 >60 mL/min/1.73m2   Lipid Profile     Status: Abnormal   Result Value Ref Range    Cholesterol 152 <200 mg/dL    Triglycerides 100 <150 mg/dL    Direct Measure HDL 34 (L) >=40 mg/dL    LDL Cholesterol Calculated 98 <=100 mg/dL    Non HDL Cholesterol 118 <130 mg/dL    Patient Fasting > 8hrs? Yes     Narrative    Cholesterol  Desirable:  <200 mg/dL    Triglycerides  Normal:  Less than 150 mg/dL  Borderline High:  150-199 mg/dL  High:  200-499 mg/dL  Very High:  Greater than or equal to 500 mg/dL    Direct Measure HDL  Female:  Greater than or equal to 50 mg/dL   Male:  Greater than or equal to 40 mg/dL    LDL Cholesterol  Desirable:  <100mg/dL  Above Desirable:  100-129 mg/dL   Borderline High:  130-159 mg/dL   High:  160-189 mg/dL   Very High:  >= 190 mg/dL    Non HDL Cholesterol  Desirable:  130 mg/dL  Above Desirable:  130-159 mg/dL  Borderline High:  160-189 mg/dL  High:  190-219 mg/dL  Very High:  Greater than or equal to 220 mg/dL   Vitamin D Deficiency     Status: Normal   Result Value Ref Range    Vitamin D, Total (25-Hydroxy) 62 20 - 75 ug/L    Narrative    Season, race, dietary intake, and treatment affect the concentration of 25-hydroxy-Vitamin D. Values may decrease during winter months and increase during summer months. Values 20-29 ug/L may indicate Vitamin D insufficiency and values <20 ug/L may indicate Vitamin D deficiency.    Vitamin D determination is routinely performed by an immunoassay specific for 25 hydroxyvitamin D3.  If an individual is on vitamin D2(ergocalciferol) supplementation, please specify 25 OH vitamin D2 and  D3 level determination by LCMSMS test VITD23.     Hemoglobin A1c     Status: Normal   Result Value Ref Range    Hemoglobin A1C 5.4 0.0 - 5.6 %

## 2021-10-14 NOTE — PROGRESS NOTES
SUBJECTIVE:   Cezar Lockhart is a 81 year old male who presents for Preventive Visit.    The patient presents with his wife.  I have not seen him for quite some time.  He has diabetes as noted and does not check your sugars and lately has not been taking Metformin as it seems to cause GI upset.  With respect to his blood pressure I added Norvasc last year but he is not taking that.  He is not monitoring his blood pressure.  His wife checks his feet and he has no sores.  He has chronic joint pains.  He otherwise is doing quite well.  He had one slip and fall a few months ago but none since then.  He offers no complaint except for a runny nose with postnasal drainage.  It is clear.  No sinus pain or pressure.  This is been for a couple years.    The patient has not left his house since Covid began.  As noted before he has chronic joint issues and because of this really cannot get around.  He is not able to sit.  To get into bed on his wife lifts his feet and then moves his feet over.  He is not able to exercise.  As noted above he has not been taking his Metformin or his simvastatin.    He did not want to take off any of his cloths and did not want much of an exam today either.  He does not want the Covid shot but did agree to the flu shot.               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)      Fracture, hip (H) young     HTN (hypertension)     added norvasc 6/19     Peripheral neuropathy      Vitamin D deficiency              Past Surgical History:      Past Surgical History:   Procedure Laterality Date     APPENDECTOMY OPEN       HERNIA REPAIR               Social History:     Social History     Socioeconomic History     Marital status:      Spouse name: Not on file     Number of children: Not on file     Years of  education: Not on file     Highest education level: Not on file   Occupational History     Not on file   Tobacco Use     Smoking status: Former Smoker     Quit date: 1995     Years since quittin.9     Smokeless tobacco: Current User     Types: Chew   Substance and Sexual Activity     Alcohol use: Yes     Alcohol/week: 0.0 standard drinks     Comment: 1 drink yearly     Drug use: No     Sexual activity: Yes     Partners: Female   Other Topics Concern     Parent/sibling w/ CABG, MI or angioplasty before 65F 55M? Not Asked   Social History Narrative     Not on file     Social Determinants of Health     Financial Resource Strain:      Difficulty of Paying Living Expenses:    Food Insecurity:      Worried About Running Out of Food in the Last Year:      Ran Out of Food in the Last Year:    Transportation Needs:      Lack of Transportation (Medical):      Lack of Transportation (Non-Medical):    Physical Activity:      Days of Exercise per Week:      Minutes of Exercise per Session:    Stress:      Feeling of Stress :    Social Connections:      Frequency of Communication with Friends and Family:      Frequency of Social Gatherings with Friends and Family:      Attends Denominational Services:      Active Member of Clubs or Organizations:      Attends Club or Organization Meetings:      Marital Status:    Intimate Partner Violence:      Fear of Current or Ex-Partner:      Emotionally Abused:      Physically Abused:      Sexually Abused:              Family History:   reviewed         Allergies:     Allergies   Allergen Reactions     No Known Allergies              Medications:     Current Outpatient Medications   Medication Sig Dispense Refill     amLODIPine (NORVASC) 5 MG tablet Take 1 tablet (5 mg) by mouth daily 90 tablet 3     aspirin 81 MG tablet Take  by mouth daily.  3     atenolol (TENORMIN) 50 MG tablet Take 1 tablet (50 mg) by mouth 2 times daily 180 tablet 3     blood glucose monitoring (ACCU-CHEK  "MULTICLIX) lancets Use to test blood sugars 2 times daily as directed. 3 Box 6     glucose blood VI test strips strip  100 strip prn     ibuprofen (ADVIL,MOTRIN) 200 MG tablet Take 1 tablet by mouth 2 times daily.       ipratropium (ATROVENT) 0.06 % nasal spray Spray 2 sprays into both nostrils 3 times daily 15 mL 1     lisinopril-hydrochlorothiazide (ZESTORETIC) 20-12.5 MG tablet Take 1 tablet by mouth daily 90 tablet 3     LORazepam (ATIVAN) 0.5 MG tablet Take 1 tablet (0.5 mg) by mouth daily as needed for anxiety 20 tablet 0     metFORMIN (GLUCOPHAGE) 500 MG tablet Take 1 tablet (500 mg) by mouth 2 times daily (with meals) 180 tablet 3     metFORMIN (GLUCOPHAGE-XR) 500 MG 24 hr tablet Take 2 tablets (1,000 mg) by mouth daily (with dinner) 180 tablet 3     Multiple Vitamin (MULTIVITAMINS PO) Take 1 tablet by mouth daily.       simvastatin (ZOCOR) 20 MG tablet Take 1 tablet (20 mg) by mouth At Bedtime 90 tablet 3     VITAMIN D, CHOLECALCIFEROL, PO Take by mouth daily                 Review of Systems:   The 10 point Review of Systems is negative other than noted in the HPI           Physical Exam:   Blood pressure (!) 164/88, pulse 70, height 1.803 m (5' 11\"), weight 102.5 kg (226 lb).    Exam:  Constitutional: healthy appearing, alert and in no distress  Heent: Normocephalic. Head without obvious masses or lesions. PERRLDC, EOMI.  Tm's and canals within normal limits bilaterally. Neck supple, no nuchal rigidity or masses. No supraclavicular, or cervical adenopathy. Thyroid symmetric, no masses.  Cardiovascular: Regular rate and rhythm, no murmer, rub or gallops.  JVP not elevated, trace bilat lower leg edema.  Carotids within normal limits bilaterally, no bruits.  Respiratory: Normal respiratory effort.  Lungs clear, normal flow, no wheezing or crackles.  Gastrointestinal: soft nt  Genitourinary: Rectal not done  Musculoskeletal: refused to remove clothing, uses 2 canes, stands entire time  Neurologic: Mental " "status within normal limits.  Speech fluent.  No gross motor abnormalities and gait intact.  Psychiatric: mentation appears normal and affect normal.         Data:   Labs sent        Assessment:   1. Normal complete physical exam  2. Hypertension, control not great  3. Niddm, off meds  4. Elevated cholesterol, off meds  5. Rhinitis, try atrovent  6. Chronic jt pains, low back pain  7. hcm         Plan:   Flu shot  Did not want covid shot  Letter with labs  Add norvasc 5mg      Madi Huynh M.D.            Patient has been advised of split billing requirements and indicates understanding: Yes   Are you in the first 12 months of your Medicare coverage?  No    Healthy Habits:     In general, how would you rate your overall health?  Good    Frequency of exercise:  2-3 days/week    Duration of exercise:  Less than 15 minutes    Do you usually eat at least 4 servings of fruit and vegetables a day, include whole grains    & fiber and avoid regularly eating high fat or \"junk\" foods?  Yes    Taking medications regularly:  Yes    Barriers to taking medications:  None    Medication side effects:  None    Ability to successfully perform activities of daily living:  No assistance needed    Home Safety:  No safety concerns identified    Hearing Impairment:  No hearing concerns    In the past 6 months, have you been bothered by leaking of urine?  No    In general, how would you rate your overall mental or emotional health?  Very good      PHQ-2 Total Score: 0    Additional concerns today:  No    Do you feel safe in your environment? Yes    Have you ever done Advance Care Planning? (For example, a Health Directive, POLST, or a discussion with a medical provider or your loved ones about your wishes): No, advance care planning information given to patient to review.  Patient plans to discuss their wishes with loved ones or provider.         Fall risk  Fallen 2 or more times in the past year?: No  Any fall with injury in the past " year?: No    Cognitive Screening   1) Repeat 3 items (Leader, Season, Table)    2) Clock draw: NORMAL  3) 3 item recall: Recalls 3 objects  Results: 3 items recalled: COGNITIVE IMPAIRMENT LESS LIKELY    Mini-CogTM Copyright S [employer]. Licensed by the author for use in [facility]; reprinted with permission (catherine@[facility]). All rights reserved.      Do you have sleep apnea, excessive snoring or daytime drowsiness?: yes    Reviewed and updated as needed this visit by clinical staff                 Reviewed and updated as needed this visit by Provider                Social History     Tobacco Use     Smoking status: Former Smoker     Quit date: 1995     Years since quittin.9     Smokeless tobacco: Current User     Types: Chew   Substance Use Topics     Alcohol use: Yes     Alcohol/week: 0.0 standard drinks     Comment: 1 drink yearly     If you drink alcohol do you typically have >3 drinks per day or >7 drinks per week? No    Alcohol Use 10/28/2016   Prescreen: >3 drinks/day or >7 drinks/week? The patient does not drink >3 drinks per day nor >7 drinks per week.               Current providers sharing in care for this patient include:   Patient Care Team:  Madi Huynh MD as PCP - General (Internal Medicine)  Madi Huynh MD as Assigned PCP    The following health maintenance items are reviewed in Epic and correct as of today:  Health Maintenance Due   Topic Date Due     ANNUAL REVIEW OF  ORDERS  Never done     ADVANCE CARE PLANNING  Never done     EYE EXAM  Never done     COVID-19 Vaccine (1) Never done     HEPATITIS C SCREENING  Never done     ZOSTER IMMUNIZATION (1 of 2) Never done     DIABETIC FOOT EXAM  2015     MEDICARE ANNUAL WELLNESS VISIT  2019     A1C  2019     BMP  2020     LIPID  2020     MICROALBUMIN  2020     PHQ-2  2021     FALL RISK ASSESSMENT  2021     INFLUENZA VACCINE (1) 2021               Review of  "Systems      OBJECTIVE:   There were no vitals taken for this visit. Estimated body mass index is 32.43 kg/m  as calculated from the following:    Height as of 6/27/19: 1.778 m (5' 10\").    Weight as of 4/5/18: 102.5 kg (226 lb).  Physical Exam          ASSESSMENT / PLAN:       Patient has been advised of split billing requirements and indicates understanding: No  COUNSELING:  Reviewed preventive health counseling, as reflected in patient instructions    Estimated body mass index is 32.43 kg/m  as calculated from the following:    Height as of 6/27/19: 1.778 m (5' 10\").    Weight as of 4/5/18: 102.5 kg (226 lb).        He reports that he quit smoking about 25 years ago. His smokeless tobacco use includes chew.      Appropriate preventive services were discussed with this patient, including applicable screening as appropriate for cardiovascular disease, diabetes, osteopenia/osteoporosis, and glaucoma.  As appropriate for age/gender, discussed screening for colorectal cancer, prostate cancer, breast cancer, and cervical cancer. Checklist reviewing preventive services available has been given to the patient.    Reviewed patients plan of care and provided an AVS. The Basic Care Plan (routine screening as documented in Health Maintenance) for Cezar meets the Care Plan requirement. This Care Plan has been established and reviewed with the Patient and spouse.    Counseling Resources:  ATP IV Guidelines  Pooled Cohorts Equation Calculator  Breast Cancer Risk Calculator  Breast Cancer: Medication to Reduce Risk  FRAX Risk Assessment  ICSI Preventive Guidelines  Dietary Guidelines for Americans, 2010  [employer]'s MyPlate  ASA Prophylaxis  Lung CA Screening    Madi Huynh MD  [facility]    Identified Health Risks:  "

## 2021-10-15 LAB
ALBUMIN SERPL-MCNC: 3.7 G/DL (ref 3.4–5)
ALP SERPL-CCNC: 87 U/L (ref 40–150)
ALT SERPL W P-5'-P-CCNC: 23 U/L (ref 0–70)
ANION GAP SERPL CALCULATED.3IONS-SCNC: 10 MMOL/L (ref 3–14)
AST SERPL W P-5'-P-CCNC: 16 U/L (ref 0–45)
BILIRUB SERPL-MCNC: 0.8 MG/DL (ref 0.2–1.3)
BUN SERPL-MCNC: 31 MG/DL (ref 7–30)
CALCIUM SERPL-MCNC: 8.8 MG/DL (ref 8.5–10.1)
CHLORIDE BLD-SCNC: 103 MMOL/L (ref 94–109)
CHOLEST SERPL-MCNC: 152 MG/DL
CO2 SERPL-SCNC: 21 MMOL/L (ref 20–32)
CREAT SERPL-MCNC: 0.76 MG/DL (ref 0.66–1.25)
FASTING STATUS PATIENT QL REPORTED: YES
GFR SERPL CREATININE-BSD FRML MDRD: 87 ML/MIN/1.73M2
GLUCOSE BLD-MCNC: 139 MG/DL (ref 70–99)
HDLC SERPL-MCNC: 34 MG/DL
LDLC SERPL CALC-MCNC: 98 MG/DL
NONHDLC SERPL-MCNC: 118 MG/DL
POTASSIUM BLD-SCNC: 4 MMOL/L (ref 3.4–5.3)
PROT SERPL-MCNC: 8.2 G/DL (ref 6.8–8.8)
SODIUM SERPL-SCNC: 134 MMOL/L (ref 133–144)
TRIGL SERPL-MCNC: 100 MG/DL

## 2021-10-18 LAB — HBA1C MFR BLD: 5.4 % (ref 0–5.6)

## 2021-10-19 ENCOUNTER — TELEPHONE (OUTPATIENT)
Dept: FAMILY MEDICINE | Facility: CLINIC | Age: 81
End: 2021-10-19

## 2021-10-19 NOTE — TELEPHONE ENCOUNTER
Patient calling regarding lab results from 10/14.  Informed patient that there is still one lab to be collected and doctor may be waiting until all are completed.    Wife is bringing urine sample in on 10/21.  Martha Castellanos RN

## 2021-10-20 NOTE — RESULT ENCOUNTER NOTE
It was a pleasure seeing you for your physical examination.  I wanted to get back to you with your test results.  I have enclosed a copy for your review.      I am happy to report that your cbc or complete blood count is normal with no signs of anemia, leukemia or platelet abnormalities.  Your chemistry panel shows the slightly elevated blood sugar.  However, the other diabetes test which looks back over 3 months called the hemoglobin A1c is normal.  The bottom line is the numbers overall are fine but please be sure to eat a healthy diet and keep your weight down for this.  Your blood salts, kidney tests, liver tests, vitamin D level,and proteins are all fine.    Your total cholesterol is 152 with the normal range being below 200.  Your HDL or good cholesterol is 34 with the normal range being above 40.  Your LDL or bad cholesterol is 98 with the normal range being below 130.  These numbers are just fine.    I am happy to bring you this overall excellent report.  If you have any questions please call me.

## 2021-10-21 ENCOUNTER — APPOINTMENT (OUTPATIENT)
Dept: LAB | Facility: CLINIC | Age: 81
End: 2021-10-21
Payer: COMMERCIAL

## 2021-10-21 PROCEDURE — 82043 UR ALBUMIN QUANTITATIVE: CPT | Performed by: INTERNAL MEDICINE

## 2021-10-24 LAB
CREAT UR-MCNC: 74 MG/DL
MICROALBUMIN UR-MCNC: 323 MG/L
MICROALBUMIN/CREAT UR: 436.49 MG/G CR (ref 0–17)

## 2021-10-25 NOTE — TELEPHONE ENCOUNTER
Called patient     Received lab letter and has no questions    Aimee LAMAR, Triage RN  [facility]

## 2021-11-27 DIAGNOSIS — I10 BENIGN ESSENTIAL HYPERTENSION: ICD-10-CM

## 2021-11-29 RX ORDER — ATENOLOL 50 MG/1
TABLET ORAL
Qty: 180 TABLET | Refills: 3 | Status: SHIPPED | OUTPATIENT
Start: 2021-11-29 | End: 2022-11-21

## 2021-11-29 NOTE — TELEPHONE ENCOUNTER
Routing refill request to provider for review/approval because:  BP out of range in Oct, but pt advised to 1 year follow-up, so pended q1y  Elvira Houser RN

## 2022-11-21 DIAGNOSIS — I10 BENIGN ESSENTIAL HYPERTENSION: ICD-10-CM

## 2022-11-21 NOTE — TELEPHONE ENCOUNTER
LOV 10- Amina  No future OV scheduled    PatRep: please call patient to schedule physical with Dr Amina Morrow, RT (R)

## 2022-11-22 RX ORDER — ATENOLOL 50 MG/1
50 TABLET ORAL 2 TIMES DAILY
Qty: 180 TABLET | Refills: 0 | Status: SHIPPED | OUTPATIENT
Start: 2022-11-22 | End: 2023-02-21

## 2022-11-22 NOTE — TELEPHONE ENCOUNTER
Pending Prescriptions:                       Disp   Refills    atenolol (TENORMIN) 50 MG tablet           180 ta*0        Sig: Take 1 tablet (50 mg) by mouth 2 times daily    Routing refill request to provider for review/approval because:      Che Diaz, RN BSN MSN  [facility]

## 2022-12-05 ENCOUNTER — TELEPHONE (OUTPATIENT)
Dept: FAMILY MEDICINE | Facility: CLINIC | Age: 82
End: 2022-12-05

## 2022-12-05 DIAGNOSIS — J31.0 CHRONIC RHINITIS: ICD-10-CM

## 2022-12-05 NOTE — TELEPHONE ENCOUNTER
LOV 10- Amina  No future OV scheduled    PatRep - this is the second encounter routed to you for patient to call and schedule physical with Dr Huynh (see 11- encounter, call to patient not done).  Please contact patient to schedule office visit.    C2C 4-[birth year]-2018 wife Madhuri Morrow, RT (R)

## 2022-12-07 RX ORDER — IPRATROPIUM BROMIDE 42 UG/1
2 SPRAY, METERED NASAL 3 TIMES DAILY
Qty: 15 ML | Refills: 0 | Status: SHIPPED | OUTPATIENT
Start: 2022-12-07 | End: 2023-02-24

## 2022-12-07 NOTE — TELEPHONE ENCOUNTER
Medication filled 1 time as pt is due for a follow-up in clinic. , Please reach out to patient to schedule appointment. thank you     Aimee LAMAR, Triage RN  [facility]

## 2022-12-09 ENCOUNTER — TELEPHONE (OUTPATIENT)
Dept: FAMILY MEDICINE | Facility: CLINIC | Age: 82
End: 2022-12-09
Payer: COMMERCIAL

## 2022-12-09 DIAGNOSIS — Z23 NEEDS FLU SHOT: ICD-10-CM

## 2022-12-09 DIAGNOSIS — Z71.85 IMMUNIZATION COUNSELING: Primary | ICD-10-CM

## 2022-12-14 ENCOUNTER — PATIENT OUTREACH (OUTPATIENT)
Dept: CARE COORDINATION | Facility: CLINIC | Age: 82
End: 2022-12-14

## 2022-12-14 NOTE — PROGRESS NOTES
"Community Paramedic Program  Flu Shot     Signed order in pt's chart: yes     Visit scheduled: Wednesday, December 21st / 12:30-1pm / CP [place] (date/time/CP)     Additional information:   Spoke with pt. Confirmed referral information, and pt agreed to schedule a visit so he can receive his flu shot at home. Pt handed the phone to his wife Madhuri, who agreed to look at their calendar.     Confirmed pt's home address. She said she will be present during the visit next week. She asked that the CP pull up to the double gate at their house and \"come to the back door.\"     I gave pt's wife a brief description of our program and what to expect during the visit. She wrote down the visit details, including the CP's name and my contact information. I asked her/pt to reach out with questions or to reschedule if needed. She agreed and expressed appreciation for the call.         "

## 2022-12-15 NOTE — TELEPHONE ENCOUNTER
I called pt & spoke with he & wife Castillo.    Homebound for rest of winter.     Wife said  paramedics will be visiting to give flu shot so maybe will take vitals too.   Scheduled in first available wellness exam opening with Dr. Huynh on  6-1-23.      Gayle JENKINS MA

## 2022-12-20 ENCOUNTER — ALLIED HEALTH/NURSE VISIT (OUTPATIENT)
Dept: OTHER | Facility: CLINIC | Age: 82
End: 2022-12-20
Attending: INTERNAL MEDICINE
Payer: COMMERCIAL

## 2022-12-20 DIAGNOSIS — Z23 NEEDS FLU SHOT: ICD-10-CM

## 2022-12-20 PROCEDURE — 99207 PR COMMUNITY PARAMEDIC - PATIENT NOT BILLABLE: CPT

## 2022-12-21 NOTE — PROGRESS NOTES
"Community Paramedic One Time Visit    December 20, 2022; 1200 PM    Cezar Lockhart is a 82 year old year old adult being seen at home visit    Present at appointment:  Patient, patients spouse, Community Paramedic    Vitals:  BP Readings from Last 1 Encounters:   12/20/22 138/80     Pulse Readings from Last 1 Encounters:   12/20/22 71     Wt Readings from Last 1 Encounters:   10/14/21 102.5 kg (226 lb)     Ht Readings from Last 1 Encounters:   10/14/21 1.803 m (5' 11\")         Clinical Concerns:  Current Medical Concerns:  Need for Vaccination    Education Provided to patient: Monitor for signs and symptoms of allergic reaction to vaccine, call 911 if needed   Flu Shot given: Yes  COVID Vaccine Given: No  Lab draw or specimen collection: No      Plan:     Time spent with patient: 30    The patient meets one or more of the following criteria:  * Other flu vaccination    Community Paramedic visited with patient in home, provided one-time visit for Flu vaccination. No complications noted. No signs or symptoms of an allergic reaction after 15 minutes. Advised to call 911 if signs or symptoms should occur.    Please place a new referral if needs arise in the future.  "

## 2023-01-05 DIAGNOSIS — I10 BENIGN ESSENTIAL HYPERTENSION: ICD-10-CM

## 2023-01-06 RX ORDER — AMLODIPINE BESYLATE 5 MG/1
5 TABLET ORAL DAILY
Qty: 90 TABLET | Refills: 3 | Status: SHIPPED | OUTPATIENT
Start: 2023-01-06 | End: 2023-06-01

## 2023-01-11 DIAGNOSIS — I10 BENIGN ESSENTIAL HYPERTENSION: ICD-10-CM

## 2023-01-12 RX ORDER — LISINOPRIL AND HYDROCHLOROTHIAZIDE 12.5; 2 MG/1; MG/1
1 TABLET ORAL DAILY
Qty: 90 TABLET | Refills: 1 | Status: SHIPPED | OUTPATIENT
Start: 2023-01-12 | End: 2023-06-01

## 2023-01-12 NOTE — TELEPHONE ENCOUNTER
Received a call from the patient's wife stating the patient only has one pill left of this medication and is needing a refill ASAP.     Medication does not pass protocol. Will route HP message to PCP for review.     Patient scheduled for a future appointment with PCP.  Appointments in Next Year    Jun 01, 2023 11:00 AM  (Arrive by 10:40 AM)  Annual Wellness Visit with Madi Huynh MD  [facility] ) [phone]        Yuliet Anderson RN

## 2023-02-21 DIAGNOSIS — I10 BENIGN ESSENTIAL HYPERTENSION: ICD-10-CM

## 2023-02-21 RX ORDER — ATENOLOL 50 MG/1
TABLET ORAL
Qty: 180 TABLET | Refills: 0 | Status: SHIPPED | OUTPATIENT
Start: 2023-02-21 | End: 2023-05-23

## 2023-02-24 ENCOUNTER — TELEPHONE (OUTPATIENT)
Dept: FAMILY MEDICINE | Facility: CLINIC | Age: 83
End: 2023-02-24
Payer: COMMERCIAL

## 2023-02-24 DIAGNOSIS — J31.0 CHRONIC RHINITIS: ICD-10-CM

## 2023-02-24 RX ORDER — IPRATROPIUM BROMIDE 42 UG/1
2 SPRAY, METERED NASAL 3 TIMES DAILY
Qty: 15 ML | Refills: 1 | Status: SHIPPED | OUTPATIENT
Start: 2023-02-24 | End: 2023-06-01

## 2023-05-23 DIAGNOSIS — I10 BENIGN ESSENTIAL HYPERTENSION: ICD-10-CM

## 2023-05-23 RX ORDER — ATENOLOL 50 MG/1
TABLET ORAL
Qty: 180 TABLET | Refills: 0 | Status: SHIPPED | OUTPATIENT
Start: 2023-05-23 | End: 2023-06-01

## 2023-05-30 ENCOUNTER — TELEPHONE (OUTPATIENT)
Dept: FAMILY MEDICINE | Facility: CLINIC | Age: 83
End: 2023-05-30
Payer: COMMERCIAL

## 2023-05-30 NOTE — TELEPHONE ENCOUNTER
Patient Wife Madhuri called stated that her (the pt.) is not able to get into a vehicle and can't make his appt on Thursday 6/1/23 with PCP. Madhuri is asking if this visit can be a telephone visit and if lab work can be drawn from the home?     Dr. Huynh can this be done? I can call the pt once I have an answer.

## 2023-06-01 ENCOUNTER — VIRTUAL VISIT (OUTPATIENT)
Dept: FAMILY MEDICINE | Facility: CLINIC | Age: 83
End: 2023-06-01
Payer: COMMERCIAL

## 2023-06-01 DIAGNOSIS — I10 BENIGN ESSENTIAL HYPERTENSION: ICD-10-CM

## 2023-06-01 DIAGNOSIS — Z00.00 ENCOUNTER FOR MEDICARE ANNUAL WELLNESS EXAM: Primary | ICD-10-CM

## 2023-06-01 DIAGNOSIS — G89.29 CHRONIC JOINT PAIN: ICD-10-CM

## 2023-06-01 DIAGNOSIS — E11.42 DM TYPE 2 WITH DIABETIC PERIPHERAL NEUROPATHY (H): ICD-10-CM

## 2023-06-01 DIAGNOSIS — M25.50 CHRONIC JOINT PAIN: ICD-10-CM

## 2023-06-01 DIAGNOSIS — J31.0 CHRONIC RHINITIS: ICD-10-CM

## 2023-06-01 DIAGNOSIS — E08.21 DIABETES MELLITUS DUE TO UNDERLYING CONDITION WITH DIABETIC NEPHROPATHY, WITHOUT LONG-TERM CURRENT USE OF INSULIN (H): ICD-10-CM

## 2023-06-01 DIAGNOSIS — G62.9 PERIPHERAL POLYNEUROPATHY: ICD-10-CM

## 2023-06-01 DIAGNOSIS — E78.5 HYPERLIPIDEMIA LDL GOAL <100: ICD-10-CM

## 2023-06-01 PROCEDURE — G0439 PPPS, SUBSEQ VISIT: HCPCS | Mod: VID | Performed by: INTERNAL MEDICINE

## 2023-06-01 RX ORDER — ATENOLOL 50 MG/1
50 TABLET ORAL 2 TIMES DAILY
Qty: 180 TABLET | Refills: 3 | Status: SHIPPED | OUTPATIENT
Start: 2023-06-01 | End: 2024-08-23

## 2023-06-01 RX ORDER — IPRATROPIUM BROMIDE 42 UG/1
2 SPRAY, METERED NASAL 3 TIMES DAILY
Qty: 15 ML | Refills: 1 | Status: SHIPPED | OUTPATIENT
Start: 2023-06-01 | End: 2023-10-16

## 2023-06-01 RX ORDER — LISINOPRIL AND HYDROCHLOROTHIAZIDE 12.5; 2 MG/1; MG/1
1 TABLET ORAL DAILY
Qty: 90 TABLET | Refills: 1 | Status: SHIPPED | OUTPATIENT
Start: 2023-06-01 | End: 2023-12-26

## 2023-06-01 RX ORDER — AMLODIPINE BESYLATE 5 MG/1
5 TABLET ORAL DAILY
Qty: 90 TABLET | Refills: 3 | Status: SHIPPED | OUTPATIENT
Start: 2023-06-01 | End: 2024-04-12

## 2023-06-01 ASSESSMENT — ENCOUNTER SYMPTOMS
MYALGIAS: 0
WEAKNESS: 0
DYSURIA: 0
CHILLS: 0
NERVOUS/ANXIOUS: 0
PALPITATIONS: 0
NAUSEA: 0
FREQUENCY: 0
HEADACHES: 0
HEARTBURN: 0
ABDOMINAL PAIN: 0
ARTHRALGIAS: 1
CONSTIPATION: 0
DIZZINESS: 1
PARESTHESIAS: 0
FEVER: 0
SHORTNESS OF BREATH: 0
EYE PAIN: 0
JOINT SWELLING: 1
HEMATURIA: 0
COUGH: 0
HEMATOCHEZIA: 0
DIARRHEA: 0
SORE THROAT: 0

## 2023-06-01 ASSESSMENT — ACTIVITIES OF DAILY LIVING (ADL): CURRENT_FUNCTION: TRANSPORTATION REQUIRES ASSISTANCE

## 2023-06-01 NOTE — PROGRESS NOTES
"Cezar is a 82 year old who is being evaluated via a billable video visit.      How would you like to obtain your AVS? Mail a copy  If the video visit is dropped, the invitation should be resent by: Text to cell phone:   Will anyone else be joining your video visit? No            Subjective   Cezar is a 82 year old, presenting for the following health issues:  No chief complaint on file.         View : No data to display.              HPI     Annual Wellness Visit    This is a video visit for his annual wellness visit.  He is extremely limited in his mobility due to his severe arthritis.  In the past I have recommended rheumatology evaluation but he has not wanted this.    The patient has diabetes.  He does not check his sugars.  No sores on his feet or toes.  He is not up-to-date on an eye exam.    Other than his chronic pains he feels quite well.  No new issues or concerns.  Blood pressure was checked in December and was fine.        Patient has been advised of split billing requirements and indicates understanding: Yes     Are you in the first 12 months of your Medicare Part B coverage?  No    Physical Health:    In general, how would you rate your overall physical health? good    Outside of work, how many days during the week do you exercise?none    Outside of work, approximately how many minutes a day do you exercise?not applicable    If you drink alcohol do you typically have >3 drinks per day or >7 drinks per week? No    Do you usually eat at least 4 servings of fruit and vegetables a day, include whole grains & fiber and avoid regularly eating high fat or \"junk\" foods? Yes    Do you have any problems taking medications regularly? No    Do you have any side effects from medications? none    Needs assistance for the following daily activities: no assistance needed    Which of the following safety concerns are present in your home?  none identified     Hearing impairment: Yes, Difficulty understanding soft or " whispered speech.    In the past 6 months, have you been bothered by leaking of urine? no    Mental Health:    In general, how would you rate your overall mental or emotional health? good  PHQ-2 Score:      Do you feel safe in your environment? Yes    Have you ever done Advance Care Planning? (For example, a Health Directive, POLST, or a discussion with a medical provider or your loved ones about your wishes)? No, advance care planning information given to patient to review.  Patient plans to discuss their wishes with loved ones or provider.      Fall risk:       Cognitive Screening: Unable to complete due to virtual visit; need for additional assessment in future face-to-face visit    Do you have sleep apnea, excessive snoring or daytime drowsiness?: no    Social History     Tobacco Use     Smoking status: Former     Smokeless tobacco: Current     Types: Chew   Vaping Use     Vaping status: Not on file   Substance Use Topics     Alcohol use: Yes     Alcohol/week: 0.0 standard drinks of alcohol     Comment: 1 drink yearly              View : No data to display.                   View : No data to display.              Do you have a current opioid prescription? No  Do you use any other controlled substances or medications that are not prescribed by a provider? None    Current providers sharing in care for this patient include:   Patient Care Team:  Madi Huynh MD as PCP - General (Internal Medicine)  Madi Huynh MD as Assigned PCP    Patient has been advised of split billing requirements and indicates understanding:     I have reviewed Opioid Use Disorder and Substance Use Disorder risk factors and made any needed referrals.           Review of Systems         Objective           Vitals:  No vitals were obtained today due to virtual visit.    Physical Exam   GENERAL: Healthy, alert and no distress  EYES: Eyes grossly normal to inspection.  No discharge or erythema, or obvious scleral/conjunctival  abnormalities.  RESP: No audible wheeze, cough, or visible cyanosis.  No visible retractions or increased work of breathing.    SKIN: Visible skin clear. No significant rash, abnormal pigmentation or lesions.  NEURO: Cranial nerves grossly intact.  Mentation and speech appropriate for age.  PSYCH: Mentation appears normal, affect normal/bright, judgement and insight intact, normal speech and appearance well-groomed.    ASSESSMENT:  1. Normal complete physical exam  2. Diabetes with c/o, does not check sugars  3. Chronic jt pains  4. Hypertension, controlled  5. Elevated cholesterol, off statin  6. Neurop, due to diabetes  7. Health care maintenance    PLAN:  Community paramedics to do labs, blood pressure check  Eye exam  Call if changes  Exercise as much as can  Follow up vv 6 months    Madi Huynh M.D.              Video-Visit Details    Type of service:  Video Visit   Video Start Time: 10:48  Video End Time:11:03 AM    Originating Location (pt. Location): Home    Distant Location (provider location):  On-site  Platform used for Video Visit: Dean      He is at risk for lack of exercise and has been provided with information to increase physical activity for the benefit of his well-being.  The patient was provided with written information regarding signs of hearing loss.

## 2023-06-01 NOTE — PATIENT INSTRUCTIONS
Patient Education   Personalized Prevention Plan  You are due for the preventive services outlined below.  Your care team is available to assist you in scheduling these services.  If you have already completed any of these items, please share that information with your care team to update in your medical record.  Health Maintenance Due   Topic Date Due     ANNUAL REVIEW OF HM ORDERS  Never done     Eye Exam  Never done     COVID-19 Vaccine (1) Never done     Zoster (Shingles) Vaccine (1 of 2) Never done     Diabetic Foot Exam  05/22/2015     A1C Lab  04/14/2022     Basic Metabolic Panel  10/14/2022     Cholesterol Lab  10/14/2022     Kidney Microalbumin Urine Test  10/21/2022     Diptheria Tetanus Pertussis (DTAP/TDAP/TD) Vaccine (2 - Td or Tdap) 11/20/2022       Exercise for a Healthier Heart  You may wonder how you can improve the health of your heart. If you re thinking about exercise, you re on the right track. You don t need to become an athlete. But you do need a certain amount of brisk exercise to help strengthen your heart. If you have been diagnosed with a heart condition, your healthcare provider may advise exercise to help your condition. To help make exercise a habit, choose safe, fun activities.      Exercise with a friend. When activity is fun, you're more likely to stick with it.     Before you start  Check with your healthcare provider before starting an exercise program. This is especially important if you haven't been active for a while. It's also important if you have a long-term (chronic) health problem such as heart disease, diabetes, or obesity. Also check with your provider if you're at high risk for having these problems.   Why exercise?  Exercising regularly offers many healthy rewards. It can help you do all of these:     Improve your blood cholesterol level to help prevent further heart trouble.    Lower your blood pressure to help prevent a stroke or heart attack.    Control diabetes or  reduce your risk of getting this disease.    Improve your heart and lung function.    Reach and stay at a healthy weight.    Make your muscles stronger so you can stay active.    Prevent falls and fractures by slowing the loss of bone mass (osteoporosis).    Manage stress better.    Improve your sense of self and your body image.  Exercise tips      Ease into your routine. Set small goals. Then build on them. Talk with your healthcare provider first before starting an exercise routine if you're not sure what your activity level should be.    Exercise on most days. Aim for a total of at least 150 minutes (2 hours and 30 minutes) or more of moderate-intensity aerobic activity each week. You could also do 75 minutes (1 hour and 15 minutes) or more of vigorous-intensity aerobic activity each week. Or try for a combination of both. Moderate activity means that you breathe heavier and your heart rate increases, but you can still talk. Think about doing at least 30 minutes of moderate exercise, 5 times a week. It's OK to work up to the 30-minute period over time. Examples of moderate-intensity activity are brisk walking, gardening, and water aerobics.    Step up your daily activity level.  Along with your exercise program, try being more active the whole day. Walk instead of drive. Or park further away so that you take more steps each day. Do more household tasks or yard work. You may not be able to meet the advised amount of physical activity. But doing some moderate- or vigorous-intensity aerobic activity can help reduce your risk for heart disease. Your healthcare provider can help you figure out what is best for you.    Choose 1 or more activities you enjoy.  Walking is one of the easiest things you can do. You can also try swimming, riding a bike, dancing, or taking an exercise class.    Call 911  Call 911 right away if any of these occur:     Chest pain that doesn't go away quickly with rest    New burning,  tightness, pressure, or heaviness in your chest, neck, shoulders, back, or arms    Abnormal or severe shortness of breath    A very fast or irregular heartbeat (palpitations)    Fainting  When to call your healthcare provider  Call your healthcare provider if you have any of these:     Dizziness or lightheadedness    Mild shortness of breath or chest pain    Increased or new joint or muscle pain    [employer] last reviewed this educational content on 7/1/2022 2000-2022 [employer]. All rights reserved. This information is not intended as a substitute for professional medical care. Always follow your healthcare professional's instructions.          Signs of Hearing Loss  Hearing loss is a problem shared by many people. In fact, it's one of the most common health problems, particularly as people age. Most people aged 65 and older have some hearing loss. By age 80, almost everyone does. Hearing loss often occurs slowly over the years. So, you may not realize your hearing has gotten worse.   When sudden hearing loss occurs, it's important to contact your healthcare provider right away. Your provider will do a medical exam and a hearing exam as soon as possible. This is to help find the cause and type of your sudden hearing loss. Based on your diagnosis, your healthcare provider will discuss possible treatments.      Hearing much better with one ear can be a sign of hearing loss.     Have your hearing checked  Call your healthcare provider if you:     Have to strain to hear normal conversation    Have to watch other people s faces very carefully to follow what they re saying    Need to ask people to repeat what they ve said    Often misunderstand what people are saying    Turn the volume of the television or radio up so high that others complain    Feel that people are mumbling when they re talking to you    Find that the effort to hear leaves you feeling tired and irritated    Notice, when using the  phone, that you hear better with one ear than the other  [employer] last reviewed this educational content on 6/1/2022 2000-2022 [employer]. All rights reserved. This information is not intended as a substitute for professional medical care. Always follow your healthcare professional's instructions.

## 2023-06-06 ENCOUNTER — PATIENT OUTREACH (OUTPATIENT)
Dept: CARE COORDINATION | Facility: CLINIC | Age: 83
End: 2023-06-06
Payer: COMMERCIAL

## 2023-06-06 NOTE — PROGRESS NOTES
Community Paramedic Program  Community Health Worker Outreach    UTC/Voicemail    Outreach attempted x 1.      Left message on patient's voicemail with call back information and requested return call.    CHW Follow-up Plan: will try to reach patient again in 1-2 business days.    Note: Unable to reach patient. Will reach out again tomorrow 6/7/23    Jaylene Houston  Community Health Worker  Connected Care Resource [facility] [place] [place]  [phone]

## 2023-06-07 NOTE — PROGRESS NOTES
Community Paramedic Program  Community Health Worker Initial Outreach      Referral source:   Reason(s) for visit: Recheck     Labs/Injections (please ensure orders have been placed in Epic)    Goals for visit(s): Hemoglobin A1C Stabilization     HTN Stabilization    How often should patient be seen: Other every 6 months for labs   Preference on when patient should be seen: Within 1 Month              Initial visit: 6/13/23 12:00 pm           Additional information: Patients spouse Madhuri will be present. Madhuri asked if Tiana could go in the back door. They do have a friendly cat and dog as well. It is a house and Tiana can park anywhere.

## 2023-06-13 ENCOUNTER — LAB (OUTPATIENT)
Dept: LAB | Facility: CLINIC | Age: 83
End: 2023-06-13
Payer: COMMERCIAL

## 2023-06-13 ENCOUNTER — ALLIED HEALTH/NURSE VISIT (OUTPATIENT)
Dept: OTHER | Facility: CLINIC | Age: 83
End: 2023-06-13
Payer: COMMERCIAL

## 2023-06-13 VITALS
SYSTOLIC BLOOD PRESSURE: 132 MMHG | TEMPERATURE: 97.6 F | OXYGEN SATURATION: 98 % | HEART RATE: 67 BPM | DIASTOLIC BLOOD PRESSURE: 78 MMHG

## 2023-06-13 DIAGNOSIS — E11.42 DM TYPE 2 WITH DIABETIC PERIPHERAL NEUROPATHY (H): ICD-10-CM

## 2023-06-13 DIAGNOSIS — Z00.00 ENCOUNTER FOR MEDICARE ANNUAL WELLNESS EXAM: ICD-10-CM

## 2023-06-13 DIAGNOSIS — I10 BENIGN ESSENTIAL HYPERTENSION: ICD-10-CM

## 2023-06-13 DIAGNOSIS — E08.21 DIABETES MELLITUS DUE TO UNDERLYING CONDITION WITH DIABETIC NEPHROPATHY, WITHOUT LONG-TERM CURRENT USE OF INSULIN (H): ICD-10-CM

## 2023-06-13 DIAGNOSIS — E78.5 HYPERLIPIDEMIA LDL GOAL <100: ICD-10-CM

## 2023-06-13 LAB
ALBUMIN SERPL BCG-MCNC: 4.3 G/DL (ref 3.5–5.2)
ALP SERPL-CCNC: 99 U/L (ref 40–129)
ALT SERPL W P-5'-P-CCNC: 16 U/L (ref 0–70)
ANION GAP SERPL CALCULATED.3IONS-SCNC: 16 MMOL/L (ref 7–15)
AST SERPL W P-5'-P-CCNC: 25 U/L (ref 0–45)
BILIRUB SERPL-MCNC: 0.6 MG/DL
BUN SERPL-MCNC: 33.7 MG/DL (ref 8–23)
CALCIUM SERPL-MCNC: 9.7 MG/DL (ref 8.8–10.2)
CHLORIDE SERPL-SCNC: 98 MMOL/L (ref 98–107)
CHOLEST SERPL-MCNC: 201 MG/DL
CREAT SERPL-MCNC: 0.83 MG/DL (ref 0.67–1.17)
CREAT UR-MCNC: 304 MG/DL
DEPRECATED HCO3 PLAS-SCNC: 22 MMOL/L (ref 22–29)
ERYTHROCYTE [DISTWIDTH] IN BLOOD BY AUTOMATED COUNT: 13.9 % (ref 10–15)
GFR SERPL CREATININE-BSD FRML MDRD: 88 ML/MIN/1.73M2
GLUCOSE SERPL-MCNC: 137 MG/DL (ref 70–99)
HBA1C MFR BLD: 6.4 %
HCT VFR BLD AUTO: 40.5 % (ref 40–53)
HDLC SERPL-MCNC: 40 MG/DL
HGB BLD-MCNC: 13.2 G/DL (ref 13.3–17.7)
LDLC SERPL CALC-MCNC: 132 MG/DL
MCH RBC QN AUTO: 31.6 PG (ref 26.5–33)
MCHC RBC AUTO-ENTMCNC: 32.6 G/DL (ref 31.5–36.5)
MCV RBC AUTO: 97 FL (ref 78–100)
MICROALBUMIN UR-MCNC: 829 MG/L
MICROALBUMIN/CREAT UR: 272.7 MG/G CR (ref 0–17)
NONHDLC SERPL-MCNC: 161 MG/DL
PLATELET # BLD AUTO: 381 10E3/UL (ref 150–450)
POTASSIUM SERPL-SCNC: 4.3 MMOL/L (ref 3.4–5.3)
PROT SERPL-MCNC: 8.2 G/DL (ref 6.4–8.3)
RBC # BLD AUTO: 4.18 10E6/UL (ref 4.4–5.9)
SODIUM SERPL-SCNC: 136 MMOL/L (ref 136–145)
TRIGL SERPL-MCNC: 146 MG/DL
WBC # BLD AUTO: 9.8 10E3/UL (ref 4–11)

## 2023-06-13 PROCEDURE — 83036 HEMOGLOBIN GLYCOSYLATED A1C: CPT

## 2023-06-13 PROCEDURE — 82570 ASSAY OF URINE CREATININE: CPT

## 2023-06-13 PROCEDURE — 80061 LIPID PANEL: CPT

## 2023-06-13 PROCEDURE — 36415 COLL VENOUS BLD VENIPUNCTURE: CPT

## 2023-06-13 PROCEDURE — 80053 COMPREHEN METABOLIC PANEL: CPT

## 2023-06-13 PROCEDURE — 85014 HEMATOCRIT: CPT

## 2023-06-13 PROCEDURE — 99207 PR COMMUNITY PARAMEDIC - PATIENT NOT BILLABLE: CPT

## 2023-06-13 NOTE — PROGRESS NOTES
"Community Paramedic One Time Visit    June 13, 2023; 1:17 PM    Cezar Lockhart is a 82 year old year old adult being seen at home visit    Present at appointment:  Patient, wife    Vitals:  BP Readings from Last 1 Encounters:   06/13/23 132/78     Pulse Readings from Last 1 Encounters:   06/13/23 67     Wt Readings from Last 1 Encounters:   10/14/21 102.5 kg (226 lb)     Ht Readings from Last 1 Encounters:   10/14/21 1.803 m (5' 11\")         Clinical Concerns:  Current Medical Concerns:  Need for (Vaccination; Lab draw/sample)    Education Provided to patient: Monitor for signs and symptoms of allergic reaction to vaccine, call 911 if needed   Flu Shot given: No  COVID Vaccine Given: No  Lab draw or specimen collection: Yes      Plan:     Time spent with patient: 30    The patient meets one or more of the following criteria:  * Requires services to prevent readmission to a nursing home or hospital    Acute concern/Follow-up recommendations: One- time visit, will need new CP referral if additional needs arise    Issues for Provider to follow up on: Community Paramedic visited patient in home, provided one-time visit.     A venous blood draw and urine sample were obtained without issue    "

## 2023-06-15 NOTE — RESULT ENCOUNTER NOTE
Mr. Lockhart,    You should be able to view your test results.  For the most part they look very good.  Your diabetes test or hemoglobin A1c is just slightly higher than last year at 6.4 but overall not bad.  Your chemistry panel shows normal blood salts, kidney test, liver test, and proteins.  Your CBC your blood count shows a normal white count and platelet count.  Your hemoglobin is just slightly lower than before but just barely so I am not worried.  I will want to repeat this in 6 months.    Your total cholesterol is a bit high at 201.  Your HDL or good cholesterol is fine but the LDL or bad is just a bit high.  Please try to watch your diet to improve this.    Lastly, there is some protein in the urine.  It certainly is not severe but it is present.  There are some new medications that we often use in this instance to help prevent deterioration of kidney function which you have not had yet.  They are actually diabetes pills with also good for the kidneys.  If possible I would suggest starting one.  The medications are very good and safe and usually do not have side effects.  The biggest problem can be cost as insurance does not always cover them in which case they can be quite expensive.  Please let me know your thoughts on these and if I can prescribe them.  It is a pill you take once a day.    I would like to have you follow-up in 6 months as we did this time.  Please let me know about the medication.    Madi Huynh M.D.

## 2023-08-01 ENCOUNTER — TRANSFERRED RECORDS (OUTPATIENT)
Dept: MULTI SPECIALTY CLINIC | Facility: CLINIC | Age: 83
End: 2023-08-01

## 2023-08-01 LAB — RETINOPATHY: NORMAL

## 2023-09-15 ENCOUNTER — NURSE TRIAGE (OUTPATIENT)
Dept: FAMILY MEDICINE | Facility: CLINIC | Age: 83
End: 2023-09-15
Payer: COMMERCIAL

## 2023-09-15 DIAGNOSIS — Z59.82 TRANSPORTATION INSECURITY: Primary | ICD-10-CM

## 2023-09-15 SDOH — ECONOMIC STABILITY - TRANSPORTATION SECURITY: TRANSPORTATION INSECURITY: Z59.82

## 2023-09-15 NOTE — TELEPHONE ENCOUNTER
"Nurse Triage SBAR    Is this a 2nd Level Triage? NO    Situation: Pt's wife called the clinic, with pt in the background, stating that he has had red and watery eyes for the last 2-3 months. Both eyes have blurry vision, but the right eye is worse. Pt states 30 years ago his right eye was scratched by a dog.     Background: Pt's wife states she has not been able to get the pt to the eye doctor in awhile because the pt is homebound d/t mobility issues.     Assessment: Pt states the peripheral vision in his right eye is \"gone\". Both eyes have blurry vision, but pt unsure if it's d/t his watery eyes. He states, \"It's like looking through wax paper, I can see an image but no details\". Pt's wife states both whites of his eyes are red, and the right eyelid is slightly drooped. Denies swollen or red eyelids. Pt states there is rarely ever pain, only once in awhile.     Protocol Recommended Disposition:   See in Office Today or Tomorrow    Recommendation: Pt's wife states she cannot bring him into the clinic, and that she wants the pt's PCP to \"send someone out to look at his eyes.\"   Routing to PCP to advise further. Can a referral be made?    Routed to provider    Does the patient meet one of the following criteria for ADS visit consideration? 16+ years old, with an [facility] PCP     1. LOCATION: Location: \"What's red, the eyeball or the outer eyelids?\" (Note: when callers say the eye is red, they usually mean the sclera is red)        The whites of his eyes are red  2. REDNESS OF SCLERA: \"Is the redness in one or both eyes?\" \"When did the redness start?\"       Both eyes, right eye is worse  3. ONSET: \"When did the eye become red?\" (e.g., hours, days)       \"Awhile\", last 2-3 months it has gotten worse  4. EYELIDS: \"Are the eyelids red or swollen?\" If Yes, ask: \"How much?\"       No  5. VISION: \"Is there any difficulty seeing clearly?\"       Peripheral on right eye is \"gone\"  Blurry vision in right eye  Can see an image but not " "the details  Like looking through a piece of wax paper  6. ITCHING: \"Does it feel itchy?\" If so ask: \"How bad is it\" (e.g., Scale 1-10; or mild, moderate, severe)      No  7. PAIN: \"Is there any pain? If Yes, ask: \"How bad is it?\" (e.g., Scale 1-10; or mild, moderate, severe)      \"Very little\", once in awhile  8. CONTACT LENS: \"Do you wear contacts?\"      No  9. CAUSE: \"What do you think is causing the redness?\"      Unsure  10. OTHER SYMPTOMS: \"Do you have any other symptoms?\" (e.g., fever, runny nose, cough, vomiting)        Right eyelid is more \"droopy\"  Reason for Disposition   Red eyes present > 7 days    Additional Information   Negative: Chemical got in the eye   Negative: Piece of something got in the eye   Negative: Followed an eye injury   Negative: Eyelid is swollen and no redness of white of eye (sclera)   Negative: Yellow or green pus in the eyes   Negative: SEVERE eye pain   Negative: Recent eye surgery and has increasing eye pain   Negative: Patient sounds very sick or weak to the triager   Negative: MODERATE eye pain or discomfort (e.g., interferes with normal activities or awakens from sleep; more than mild)   Negative: Looking at light causes MODERATE to SEVERE eye pain (i.e., photophobia)   Negative: New or worsening blurred vision   Negative: Cloudy spot or sore seen on the cornea (clear part of the eye)   Negative: Eyelids are very swollen (shut or almost)   Negative: Eyelid (outer) is very red   Negative: Vomiting   Negative: Foreign body sensation ('feels like something is in there')   Negative: Patient wants to be seen   Negative: Eye pain present > 24 hours   Negative: Bleeding on white of the eye and is taking Coumadin or known bleeding disorder (e.g., thrombocytopenia)   Negative: Only 1 eye is red, and persists > 48 hours    Protocols used: Eye - Red Without Pus-A-OH    "

## 2023-09-15 NOTE — TELEPHONE ENCOUNTER
I do not know how to get somebody out to look at him.  Perhaps ask care coordinator about this but the bottom line is he really needs to get into see ophthalmology.  May be care coordinator can assist with transportation.    Thank you    Madi Huynh M.D.

## 2023-09-15 NOTE — TELEPHONE ENCOUNTER
Patient Contact    Attempt # 1    Was call answered?  Yes. Writer relayed PCP's message below, patient's wife Madhuri (C2C) expressed verbal understanding.    Writer placed care coordination referral and advised patient to schedule ophthalmology appointment in the meantime, before being contacted by a care coordinator.    Kerrie Vanessa RN  -[facility]

## 2023-09-18 ENCOUNTER — PATIENT OUTREACH (OUTPATIENT)
Dept: CARE COORDINATION | Facility: CLINIC | Age: 83
End: 2023-09-18
Payer: COMMERCIAL

## 2023-09-18 NOTE — PROGRESS NOTES
Clinic Care Coordination Contact  Community Health Worker Initial Outreach    Spoke with patient's wife, Eli RAINEY on file 4/5/18.    Patient's wife stated she plans to bring her  to an eye appointment tomorrow, 9/19/21 in the van.    CHW offered transportation resources including stretcher transportation.  Patient's wife declined and stated she will call CC if she needs resources for transportation.    CHW Initial Information Gathering:  Referral Source: PCP    Reason for Referral:  Resources for Transportation    Patient accepts CC: No, not at this time. Patient will be sent Care Coordination introduction letter for future reference.     Plan: Care Coordinator will send care coordination introduction letter with care coordinator contact information and explanation of care coordination services via mail. Care     Coordinator will do no further outreaches at this time.    VIKASH Urbano  Clinic Care Coordination  [facility]: [facility] [place], [place], [place], and [facility]  Phone: [phone]

## 2023-09-18 NOTE — LETTER
[facility] CARE COORDINATION  [address]  [facility] [address]    September 18, 2023    Cezar Lockhart  [address]  [facility] [address]      Dear Cezar,        I am a  clinic community health worker who works with Madi Huynh MD with the [facility]. I wanted to thank you for spending the time to talk with me.  Below is a description of clinic care coordination and how I can further assist you.       The clinic care coordination team is made up of a registered nurse, , financial resource worker and community health worker who understand the health care system. The goal of clinic care coordination is to help you manage your health and improve access to the health care system. Our team works alongside your provider to assist you in determining your health and social needs. We can help you obtain health care and community resources, providing you with necessary information and education. We can work with you through any barriers and develop a care plan that helps coordinate and strengthen the communication between you and your care team.  Our services are voluntary and are offered without charge to you personally.    Please feel free to contact me with any questions or concerns regarding care coordination and what we can offer.      We are focused on providing you with the highest-quality healthcare experience possible.    Sincerely,     VIKASH Urbano  Clinic Care Coordination  [facility]: [facility] [place], [place], [place], and [facility]  Phone: [phone]

## 2023-10-16 DIAGNOSIS — J31.0 CHRONIC RHINITIS: ICD-10-CM

## 2023-10-16 RX ORDER — IPRATROPIUM BROMIDE 42 UG/1
SPRAY, METERED NASAL
Qty: 15 ML | Refills: 0 | Status: SHIPPED | OUTPATIENT
Start: 2023-10-16 | End: 2023-12-13

## 2023-12-13 DIAGNOSIS — J31.0 CHRONIC RHINITIS: ICD-10-CM

## 2023-12-13 RX ORDER — IPRATROPIUM BROMIDE 42 UG/1
SPRAY, METERED NASAL
Qty: 15 ML | Refills: 0 | Status: SHIPPED | OUTPATIENT
Start: 2023-12-13 | End: 2024-02-08

## 2023-12-26 ENCOUNTER — VIRTUAL VISIT (OUTPATIENT)
Dept: FAMILY MEDICINE | Facility: CLINIC | Age: 83
End: 2023-12-26
Payer: COMMERCIAL

## 2023-12-26 DIAGNOSIS — E08.21 DIABETES MELLITUS DUE TO UNDERLYING CONDITION WITH DIABETIC NEPHROPATHY, WITHOUT LONG-TERM CURRENT USE OF INSULIN (H): ICD-10-CM

## 2023-12-26 DIAGNOSIS — I10 BENIGN ESSENTIAL HYPERTENSION: Primary | ICD-10-CM

## 2023-12-26 PROCEDURE — 99213 OFFICE O/P EST LOW 20 MIN: CPT | Mod: VID | Performed by: INTERNAL MEDICINE

## 2023-12-26 RX ORDER — DAPAGLIFLOZIN 5 MG/1
5 TABLET, FILM COATED ORAL DAILY
Qty: 90 TABLET | Refills: 1 | Status: SHIPPED | OUTPATIENT
Start: 2023-12-26 | End: 2024-04-09

## 2023-12-26 RX ORDER — LISINOPRIL AND HYDROCHLOROTHIAZIDE 12.5; 2 MG/1; MG/1
1 TABLET ORAL DAILY
Qty: 90 TABLET | Refills: 1 | Status: SHIPPED | OUTPATIENT
Start: 2023-12-26 | End: 2024-04-09

## 2023-12-26 NOTE — PROGRESS NOTES
Cezar is a 83 year old who is being evaluated via a billable video visit.      How would you like to obtain your AVS? MyChart  If the video visit is dropped, the invitation should be resent by: Text to cell phone: [phone]  Will anyone else be joining your video visit? No              Subjective   Cezar is a 83 year old, presenting for the following health issues:  Follow Up    This is a video visit with the patient and his wife and daughter present.  He has diabetes and is not on medication.  He is up-to-date on eye exam.  He does not check his sugars.  He does have proteinuria which is not new.    He has a lot of joint pains.  This is not new.  Advil helps some.    He had labs done in June.  It is very difficult for him to get to the doctor's office.        Objective    Vitals - Patient Reported  Systolic (Patient Reported): 124  Diastolic (Patient Reported): 50      Vitals:  No vitals were obtained today due to virtual visit.    Physical Exam   GENERAL: Healthy, alert and no distress  EYES: Eyes grossly normal to inspection.  No discharge or erythema, or obvious scleral/conjunctival abnormalities.  RESP: No audible wheeze, cough, or visible cyanosis.  No visible retractions or increased work of breathing.    SKIN: Visible skin clear. No significant rash, abnormal pigmentation or lesions.  NEURO: Cranial nerves grossly intact.  Mentation and speech appropriate for age.  PSYCH: Mentation appears normal, affect normal/bright, judgement and insight intact, normal speech and appearance well-groomed.    ASSESSMENT:  Diabetes with c/o, not on meds  Hypertension, controlled  Severe arthritis  Health care maintenance  Proteinuria, suspect from diabetes.    PLAN:  Does not want covid shot, I rec others at pharm  Follow up 6 months  Add farxiga 5mg if he can afford it.    Madi Huynh M.D.            Video-Visit Details    Type of service:  Video Visit   Video Start Time: 3:29 PM  Video End Time:3:43  PM    Originating Location (pt. Location): Home    Distant Location (provider location):  On-site  Platform used for Video Visit: Denisa

## 2024-01-10 DIAGNOSIS — I10 BENIGN ESSENTIAL HYPERTENSION: ICD-10-CM

## 2024-01-11 RX ORDER — AMLODIPINE BESYLATE 5 MG/1
5 TABLET ORAL DAILY
Qty: 90 TABLET | Refills: 3 | OUTPATIENT
Start: 2024-01-11

## 2024-01-11 NOTE — TELEPHONE ENCOUNTER
Script sent to the pharmacy on 6/1/23 for 90 tablets with 3 additional refills on file. Patient should still have refills on file with the pharmacy.     Yuliet Anderson RN

## 2024-01-14 ENCOUNTER — HEALTH MAINTENANCE LETTER (OUTPATIENT)
Age: 84
End: 2024-01-14

## 2024-02-08 DIAGNOSIS — J31.0 CHRONIC RHINITIS: ICD-10-CM

## 2024-02-08 RX ORDER — IPRATROPIUM BROMIDE 42 UG/1
SPRAY, METERED NASAL
Qty: 15 ML | Refills: 1 | Status: SHIPPED | OUTPATIENT
Start: 2024-02-08 | End: 2024-04-12

## 2024-02-08 NOTE — TELEPHONE ENCOUNTER
Prescription approved per [facility] Refill Protocol.  Yuliet Anderson, RN  [facility] Triage Nurse

## 2024-03-19 ENCOUNTER — TELEPHONE (OUTPATIENT)
Dept: FAMILY MEDICINE | Facility: CLINIC | Age: 84
End: 2024-03-19
Payer: COMMERCIAL

## 2024-03-19 NOTE — TELEPHONE ENCOUNTER
FYI PCP:     Pt's spouse Madhuri on C2C called    Pt passed out in his living room today, was brought to [facility]. Is at the ER now    ER provider is doing imaging to assess for a blood clot in lungs currently    Family wanted to make sure PCP was aware of what is going on    Currently [facility] records are not accessible via Care Everywhere. Family will work on getting records released to PCP at discharge    Aimee LAMAR, Triage RN  [facility]

## 2024-03-25 ENCOUNTER — PATIENT OUTREACH (OUTPATIENT)
Dept: CARE COORDINATION | Facility: CLINIC | Age: 84
End: 2024-03-25
Payer: COMMERCIAL

## 2024-03-25 ENCOUNTER — TELEPHONE (OUTPATIENT)
Dept: FAMILY MEDICINE | Facility: CLINIC | Age: 84
End: 2024-03-25
Payer: COMMERCIAL

## 2024-03-25 RX ORDER — LISINOPRIL 10 MG/1
10 TABLET ORAL DAILY
COMMUNITY
End: 2024-04-09

## 2024-03-25 RX ORDER — FERROUS SULFATE 325(65) MG
325 TABLET ORAL
COMMUNITY

## 2024-03-25 NOTE — PROGRESS NOTES
Clinical Product Navigator RN reviewed chart; patient on payer product coverage.  Review results:   CPN Initial Information Gathering  Referral Source: Health Plan     Not met any any referral criteria at this time.  Will monitor for future needs.     RN Clinical Product Navigator called and spoke with patient and wife Madhuri, (CTC on file) on speaker. Patient was hospitalized at [facility] 3/19 - 3/22/2024 for Syncope and collapse. Madhuri and patient states that he is feeling much improved. Denies any concerns or complaints. Denies any on going care coordination needs. Madhuri questions if PCP can view records from Care Everywhere, reviewed that consent is needed to receive records. Patient gives consent for CPN to eSign care everywhere consent to share/receive records with [facility]. Records now available. Patient had two medication changes. Lisinopril-hydrochlorothiazide was discontinued. Patient is now on Lisinopril 10mg tablets Daily. Patient was also started on Ferrous Sulfate 325mg daily. Medication list updated. RN Clinical Product Navigator offered to assist patient in scheduling post hospital follow up with PCP. Wife Madhuri states that she needs to talk with their daughter for when she would be available to assist them in scheduling a virtual visit. Madhuri will call the clinic to schedule once she has her daughter's availability. Chart routed to PCP with status update at request of patient.     Celeste Patel RN   Clinical Product Navigator   [email or web address]   Office: [phone]

## 2024-03-25 NOTE — TELEPHONE ENCOUNTER
Home Care is calling regarding an established patient with [facility].       Requesting orders from: Madi Huynh  Provider is following patient: Yes  Is this a 60-day recertification request?  No    Orders Requested    Skilled Nursing  Request for delay in care, service is not able to be provided within same scheduled day.   3/26 d/t patient request     Physical Therapy  Request for delay in care, service is not able to be provided within same scheduled day.   3/26 d/t patient request     Occupational Therapy  Request for delay in care, service is not able to be provided within same scheduled day.   3/26 d/t patient request     Information was gathered and will be sent to provider for review.  RN will contact Home Care with information after provider review.  Confirmed ok to leave a detailed message with call back.  Contact information confirmed and updated as needed.    Rema Bird RN

## 2024-03-25 NOTE — PROGRESS NOTES
Clinic Care Coordination Contact  [facility]: Post-Discharge Note  SITUATION                                                      Admission:    Admission Date: 03/19/24   Reason for Admission: Syncope and collapse.  Discharge:   Discharge Date: 03/22/24  Discharge Diagnosis: Syncope and collapse.    BACKGROUND                                                      Per hospital discharge summary and inpatient provider notes:  CHIEF COMPLAINT: Transient loss of consciousness  Active Problems:  * No active hospital problems. *    ASSESSMENT & PLAN    This is a 83 y.o. male with history of HTN, diet-controlled T2DM admitted for syncope found to have LBBB.    Syncope  Presents with syncopal episode, endorses presyncopal lightheadedness and SOB. Suspect vasovagal versus orthostatic syncope. However given EKG with LBBB and likely Mobitz type II plan for cardiac workup as below.  - S/p 1 L IV fluids in the ED  - Orthostatic blood pressure  - Carotid ultrasound    Left bundle branch block  Mobitz type II, likely  EKG on admission demonstrated LBBB and likely Mobitz type II. No prior EKG for comparison. Negative troponin. No prior echocardiogram.  - Cardiology consult  - Telemetry  - Echocardiogram    Leukocytosis  WBC 18.5. Likely stress-induced due to syncope. No infectious signs or symptoms.  - Trend WBC    Elevated D-dimer  - CT pulmonary angiogram obtained in the ED, negative for PE.    Bilateral calcified and noncalcified pulmonary nodules  - Incidentally noted on CT.  - If the patient is a long-term smoker, a 1 year follow-up CT may be considered.    Essential hypertension  - Hold PTA lisinopril-HCTZ 20-12.5 mg daily  - Hold PTA amlodipine 5 mg daily  - Hold PTA atenolol 50 mg twice daily    Type 2 diabetes mellitus  Diet controlled, not on medications PTA.  - POC glucose checks    Hyperlipidemia  Coronary atherosclerosis  - Not on statin PTA    Chronic normocytic anemia  Hemoglobin at baseline.    CODE STATUS:  Full Code  DVT prophylaxis: None  GI prophylaxis: None  ACCESS: PIV  RESTRAINTS: None  LENGTH OF STAY: IP - Anticipated LOS >2Midnights due to acuity of clinical presentation requiring inpatient level of care  DISPOSITION: Admit for syncope, new LBBB    MEDICAL NECESSITY FOR HOSPITALIZATION:  Reason for hospitalization: Syncope, new LBBB  Past medical hx and psychosocial factors associated with complexity of Management: T2DM, HTN, chronic anemia  Needed hospital services and assessment: Hemodynamic monitoring, telemetry, cardiology consult for new left bundle branch block and Mobitz type II, echocardiogram  Because of the factors described above, this patient cannot be managed at a lower level of care.    Time: 50 minutes  Justa Christian MD  Internal Medicine - Hospitalist  _____________________________________________________    HPI: Cezar Lockhart is a 83 y.o. male with a past medical history of HTN, diet-controlled T2DM, chronic anemia who presents with transient loss of consciousness.    Patient reports syncopal episode this morning after getting out of the shower and walking into his living room he began to feel lightheaded and short of breath, his wife witnessed him lose consciousness and fell forward to the ground. He was unconscious briefly and had episode of urinary incontinence. He was also confused after regaining consciousness, he was not sure how he ended up in the living room. At time my assessment he reports nausea otherwise feels at baseline. Denies chest pain, heart palpitations, abdominal pain, vomiting.    PAST MEDICAL HISTORY:  No past medical history on file.    PAST SURGICAL HISTORY:  No past surgical history on file.    PRIOR TO ADMISSION MEDICATIONS:  None      ALLERGIES:  Patient has no known allergies.    FAMILY HISTORY:  No family history on file.    SOCIAL HISTORY:  Social History    Socioeconomic History  Marital status:   Spouse name: Not on file  Number of children: Not on  file  Years of education: Not on file  Highest education level: Not on file  Occupational History  Not on file  Tobacco Use  Smoking status: Not on file  Smokeless tobacco: Not on file  Substance and Sexual Activity  Alcohol use: Not on file  Drug use: Not on file  Sexual activity: Not on file  Other Topics Concern  Not on file  Social History Narrative  Not on file    Social Determinants of Health    Food Insecurity: Not on file  Transportation Needs: Not on file  Intimate Partner Violence: Not At Risk (3/19/2024)  Humiliation, Afraid, Rape, and Kick questionnaire  Fear of Current or Ex-Partner: No  Emotionally Abused: No  Physically Abused: No  Sexually Abused: No  Housing Stability: Not on file          ASSESSMENT           Discharge Assessment  How are you doing now that you are home?: RN Clinical Product Navigator called and spoke with patient and wife Madhuri, (CTC on file) on speaker. Patient was hospitalized at [facility] 3/19 - 3/22/2024 for Syncope and collapse. Madhuri and patient states that he is feeling much improved. Denies any concerns or complaints. Denies any on going care coordination needs. Madhuri questions if PCP can view records from Care Everywhere, reviewed that consent is needed to receive records. Patient gives consent for CPN to eSign care everywhere consent to share/receive records with [facility]. Records now available. Patient had two medication changes. Lisinopril-hydrochlorothiazide was discontinued. Patient is now on Lisinopril 10mg tablets Daily. Patient was also started on Ferrous Sulfate 325mg daily. Medication list updated. RN Clinical Product Navigator offered to assist patient in scheduling post hospital follow up with PCP. Wife Madhuri states that she needs to talk with their daughter for when she would be available to assist them in scheduling a virtual visit. Madhuri will call the clinic to schedule once she has her daughter's availability. Chart routed to PCP with status update at  request of patient.  How are your symptoms? (Red Flag symptoms escalate to triage hotline per guidelines): Improved  Do you feel your condition is stable enough to be safe at home until your provider visit?: Yes  Does the patient have their discharge instructions? : Yes  Does the patient have questions regarding their discharge instructions? : Yes (see comment)  Were you started on any new medications or were there changes to any of your previous medications? : Yes  Does the patient have all of their medications?: Yes  Do you have questions regarding any of your medications? : No  Do you have all of your needed medical supplies or equipment (DME)?  (i.e. oxygen tank, CPAP, cane, etc.): Yes  Discharge follow-up appointment scheduled within 14 calendar days? : No  Is patient agreeable to assistance with scheduling? : No              Care Management       Care Mgmt General Assessment  Referral  Referral Source: Health Plan                      PLAN                                                      Outpatient Plan:  Patient was provided with CPN's contact information and encouraged to call with questions, concerns, support needs. CPN will remain available as needed. No further RN Clinical Product Navigator outreaches will be made at this time.       No future appointments.      For any urgent concerns, please contact our 24 hour nurse triage line: [phone] ([phone])         vIy Patel RN

## 2024-03-26 ENCOUNTER — MEDICAL CORRESPONDENCE (OUTPATIENT)
Dept: HEALTH INFORMATION MANAGEMENT | Facility: CLINIC | Age: 84
End: 2024-03-26

## 2024-03-26 ENCOUNTER — TELEPHONE (OUTPATIENT)
Dept: FAMILY MEDICINE | Facility: CLINIC | Age: 84
End: 2024-03-26
Payer: COMMERCIAL

## 2024-03-26 ENCOUNTER — MYC MEDICAL ADVICE (OUTPATIENT)
Dept: FAMILY MEDICINE | Facility: CLINIC | Age: 84
End: 2024-03-26
Payer: COMMERCIAL

## 2024-03-26 NOTE — TELEPHONE ENCOUNTER
TO PCP    RN wants PCP to add diabetic ulcer to patients problem list      Home Care is calling regarding an established patient with  [email or web address].       Requesting orders from: Madi Huynh  Provider is following patient: Yes  Is this a 60-day recertification request?  No    Orders Requested    Skilled Nursing  Request for initial certification (first set of orders)   Frequency:  2x/wk for 8 wks  With 3 PRN visits for diabetic ulcer related care      PT Eval and Treat    OT Eval and Treat      Information was gathered and will be sent to provider to confirm provider will be following patient.  RN will contact Home Care with information after provider review.  Confirmed ok to leave a detailed message with call back.  Contact information confirmed and updated as needed.    Carla Patel RN

## 2024-03-26 NOTE — TELEPHONE ENCOUNTER
Verbal given for requested homecare orders.    Elvira Houser, RN  [facility] RN Triage Team

## 2024-03-27 NOTE — TELEPHONE ENCOUNTER
I called, wife Madhuri answered (CTC on file) and wanted to make sure Dr. Huynh aware of everything going on and that he got records from [facility].    I assured wife the Hosp visit, labs, xray etc all visible in chart  and  Dr. Huynh received  and acknowledged  her March 19th  & 21st  Tele Enc's and have ok'ed the home care orders.       Scheduled Video visit for April 9th, daughter will get off work to help bring up MY CHART for virtual visit.      Gayle JENKINS MA

## 2024-03-29 NOTE — TELEPHONE ENCOUNTER
Called CAROL Hunter regarding PCP message below. Left a detailed message on confidential VM with clinic number to call back if questions/concerns.

## 2024-04-09 ENCOUNTER — VIRTUAL VISIT (OUTPATIENT)
Dept: FAMILY MEDICINE | Facility: CLINIC | Age: 84
End: 2024-04-09
Payer: COMMERCIAL

## 2024-04-09 DIAGNOSIS — E78.5 HYPERLIPIDEMIA LDL GOAL <100: ICD-10-CM

## 2024-04-09 DIAGNOSIS — D50.9 IRON DEFICIENCY ANEMIA, UNSPECIFIED IRON DEFICIENCY ANEMIA TYPE: ICD-10-CM

## 2024-04-09 DIAGNOSIS — R55 VASOVAGAL SYNCOPE: Primary | ICD-10-CM

## 2024-04-09 DIAGNOSIS — E11.42 DM TYPE 2 WITH DIABETIC PERIPHERAL NEUROPATHY (H): ICD-10-CM

## 2024-04-09 DIAGNOSIS — I10 BENIGN ESSENTIAL HYPERTENSION: ICD-10-CM

## 2024-04-09 PROCEDURE — 99214 OFFICE O/P EST MOD 30 MIN: CPT | Mod: 95 | Performed by: INTERNAL MEDICINE

## 2024-04-09 RX ORDER — LISINOPRIL 10 MG/1
10 TABLET ORAL DAILY
Qty: 90 TABLET | Refills: 3 | Status: SHIPPED | OUTPATIENT
Start: 2024-04-09

## 2024-04-09 NOTE — PATIENT INSTRUCTIONS
Check the blood pressure and pulse a few times when you are wozzy. Please send me on Call Britannia or call with the blood pressure and pulse readings in one week.  I want to do labs in 3 weeks  Let's do a video visit in 4 weeks.    Madi Huynh M.D.

## 2024-04-09 NOTE — PROGRESS NOTES
Cezar is a 83 year old who is being evaluated via a billable video visit.    How would you like to obtain your AVS? MyChart  If the video visit is dropped, the invitation should be resent by: Text to cell phone: [phone]  Will anyone else be joining your video visit? No          Subjective   Cezar is a 83 year old, presenting for the following health issues:  No chief complaint on file.    Video Start Time:  3:27    This is a video visit for this very pleasant 83-year-old for hospital follow-up.    As noted and reviewed he was hospitalized from March 19 through March 20 seconds at [facility] because of a syncopal spell.  Ultimately it was likely felt to be vasovagal related to transient gastroenteritis.  On the day of admission the patient had a syncopal spell after getting out of the shower and walking into his living room.  He began to feel lightheaded and short of breath and his wife witnessed the loss of consciousness and falling to the ground.  It was a brief episode with associated urinary incontinence.  He was confused after regaining consciousness.  He was brought to the ER for evaluation.  During the hospital stay he did have some dizziness that resolved with blood pressure control.  He was found to have a left bundle branch block with suspected Mobitz type II.  Cardiology saw the patient and recommended outpatient cardiac monitoring with a Zio patch for 14 days.  On day of discharge cards said he did not need to do this.  He had an echo and this was normal, mild mr only.  He had leukocytosis that was improving and felt secondary to chronic diabetic foot ulcers and resolving gastroenteritis with no signs of systemic infection.  Antibiotics were not administered.  During the stay he was found to have bilateral calcified and noncalcified lung nodules and they recommended outpatient follow-up.    I reviewed studies done including a chest x-ray that was clear, head CT showing no acute  findings, chest CT for pulmonary embolism showing tiny bilateral calcified and noncalcified pulmonary nodules felt unlikely to be of clinical significance but if the patient was a long-term smoker they recommended 1 year follow-up.  Carotid imaging was done showing no stenosis.  Labs were done as well which are reviewed showing an elevated D-dimer, normal troponin, and normal basic panel except for glucose of 167, white count of 18.5 with a hemoglobin of 12.2 and a normal platelet count.  His magnesium level is normal, his thyroid was normal, and his second troponin was negative.  Serial CBCs showed his white count coming down and his hemoglobin stable at 12.1 at discharge.  His iron studies did show a low iron saturation with a normal ferritin.  His last hemoglobin here is 13.3 with a normal white count.  He does have known elevated glucose with an A1c of 6.4 last June.      His other history includes hypertension, diabetes with nephropathy, peripheral neuropathy, history of polio, suspected Charcot foot, chronic normocytic anemia    He still gets a bit woozy, light headed.  This comes and goes, lasting couple of minutes.  No chest pain or shortness of breath.  No syncope.  He had vertigo in the hospital but not since.  No palpitations, no ha or fever.  No gi c/o now.  He does note that prior to all of this he might have short lived dizziness prior to getting into bed.  He has nurses coming twice a week and his blood pressure when they check it are not low, 140/80 today, 120/70.  He was not light headed at the time.  No new neuro issues.  No gi issues, no other c/o    ASSESSMENT:  Syncope, most consistent with vasovagal, does not seem to be related to his diabetes and no signs of acute GI bleed.  Doubt neurologic event.  Hypertension, off hydrochlorothiazide now as they felt it might be contributing to the syncope.  He will monitor his blood pressure  Diabetes with neuropathy, and insurance did not cover Farxiga so  he is not on anything.  I will have his nurses do an A1c  Iron deficiency anemia, no GI complaints.  Suspect due to his Advil use, he is taking iron and vitamin C, will get follow-up lab in 3 weeks  Hyperlipidemia, to get labs    PLAN:  Fortunately he has nurses coming twice a week  His wife will get a blood pressure machine and check his blood pressure several times when he is woozy as well as his pulse and send me those.  If these are all normal then I would consider vestibular therapy  Labs in 3 weeks  Video visit in 4 weeks    Madi Huynh M.D.          Vitals:  No vitals were obtained today due to virtual visit.    Physical Exam   GENERAL: alert and no distress  EYES: Eyes grossly normal to inspection.  No discharge or erythema, or obvious scleral/conjunctival abnormalities.  RESP: No audible wheeze, cough, or visible cyanosis.    SKIN: Visible skin clear. No significant rash, abnormal pigmentation or lesions.  NEURO: Cranial nerves grossly intact.  Mentation and speech appropriate for age.  PSYCH: Appropriate affect, tone, and pace of words          Video-Visit Details    Type of service:  Video Visit   Video End Time:3:46 PM  Originating Location (pt. Location): Home    Distant Location (provider location):  On-site  Platform used for Video Visit: Dean  Signed Electronically by: Madi Huynh MD

## 2024-04-12 ENCOUNTER — TELEPHONE (OUTPATIENT)
Dept: FAMILY MEDICINE | Facility: CLINIC | Age: 84
End: 2024-04-12
Payer: COMMERCIAL

## 2024-04-12 DIAGNOSIS — I10 BENIGN ESSENTIAL HYPERTENSION: ICD-10-CM

## 2024-04-12 DIAGNOSIS — J31.0 CHRONIC RHINITIS: ICD-10-CM

## 2024-04-12 RX ORDER — AMLODIPINE BESYLATE 5 MG/1
5 TABLET ORAL DAILY
Qty: 90 TABLET | Refills: 3 | Status: SHIPPED | OUTPATIENT
Start: 2024-04-12

## 2024-04-12 RX ORDER — IPRATROPIUM BROMIDE 42 UG/1
1 SPRAY, METERED NASAL 3 TIMES DAILY
Qty: 15 ML | Refills: 1 | Status: SHIPPED | OUTPATIENT
Start: 2024-04-12 | End: 2024-05-14

## 2024-04-12 NOTE — TELEPHONE ENCOUNTER
Patient's spouse Judy calling (on C2C) to request refills for patient's medications. Judy stated patient only has 4 tablets of amlodipine remaining.     Medications and pharmacy pended. Routing to PCP for review. Please route encounter back to Triage Team so we can contact patient when it is refilled. Thank you so much!

## 2024-04-18 NOTE — TELEPHONE ENCOUNTER
Let's not change the meds given recent syncope.    Please make sure the notes in the wrong chart are removed.    Madi Huynh M.D.

## 2024-04-18 NOTE — TELEPHONE ENCOUNTER
Pt's wife was called with providers message. Triage also called RN with [facility] with verbal lab orders. Pt needs labs drawn 04/30/2024.     RN will need lab orders faxed to agency. Nurse agreed to call clinic back with fax number where she needs lab order faxed.

## 2024-04-18 NOTE — TELEPHONE ENCOUNTER
Pt's wife was called following message below. Wife states she called to report pt's BP readings not hers. .Charting was done on the wrong chart.   Pt verified he is taking Amlodipine, Atenolol and Lisinopril as prescribed. Pt has scheduled future VV with PCP for BP follow up. Does PCP recommends and medication changes before 05/14/2024 video visit?    Madi Huynh MD   to [facility] Triage [facility]   PG    4/18/24  7:30 AM  Note  Those are a bit high.  She was on hydrochlorothiazide and lisinopril and now I believe just hydrochlorothiazide so would resume lisinopril at 10mg daily, follow up blood pressure readings to me in 2 weeks.     Madi Huynh M.D.           April 17, 2024       SANDY    4/17/24  3:46 PM  Phoenix, Justice L, RN routed this conversation to Gotlieb, Paul Steven, MD Phoenix, Justice L, RN     SANDY    4/17/24  3:46 PM  Note  Spoke with patient to ensure medications were able to be picked up, patient confirmed she was able to get medications.      Patient also wanted to provide BP readings for PCP to review:     4/12 Friday 132/80, 156/75.  4/14 Sunday 145/74.  4/16 Tuesday 160/73, 146/70, 147/66.      Routing to PCP as update. Thank you so much!

## 2024-04-18 NOTE — TELEPHONE ENCOUNTER
[facility], calling back with a fax # [phone].    Fax lab results & provide verbal orders.     Dena Antonio on 4/18/2024 at 5:08 PM

## 2024-04-18 NOTE — TELEPHONE ENCOUNTER
Returned call to [place] with [facility]     Gave verbal on lab orders and also faxed over orders     Aimee LAMAR, Triage RN  [facility]

## 2024-04-24 ENCOUNTER — MEDICAL CORRESPONDENCE (OUTPATIENT)
Dept: HEALTH INFORMATION MANAGEMENT | Facility: CLINIC | Age: 84
End: 2024-04-24

## 2024-04-26 ENCOUNTER — TELEPHONE (OUTPATIENT)
Dept: FAMILY MEDICINE | Facility: CLINIC | Age: 84
End: 2024-04-26
Payer: COMMERCIAL

## 2024-04-26 NOTE — TELEPHONE ENCOUNTER
Lynnette called the clinic back. Provider's message was relayed to RN. She stated understanding and had no further questions.    Dana SIMMONS RN  [facility] Triage Team

## 2024-04-26 NOTE — TELEPHONE ENCOUNTER
CAROL Ferrer Supervisor now calling wanting Ok for PRN visit for Monday instead of tomorrow.     Please advise.

## 2024-04-26 NOTE — TELEPHONE ENCOUNTER
Elli PT with [facility] calling to request verbal orders for home care. See order details below.         Home Care is calling regarding an established patient with [facility].        3/26/2024     1:42 PM   Home Care Information   Date of Home Care episode start 3/26/2024   Current following provider Madi Huynh    Name/Phone Number Lynnette Sainz RN #[phone]   Home Care agency [facility]     Requesting orders from: Madi Huynh  Provider is following patient: Yes  Is this a 60-day recertification request?  No    Orders Requested    Physical Therapy  Request for continuation of care with increase in frequency  Frequency: One additional home care PT visit for today 4/26/2024.       Verbal orders given.  Home Care will send orders for provider to sign.  Confirmed ok to leave a detailed message with call back.  Contact information confirmed and updated as needed.    Justice L. Phoenix, RN

## 2024-04-26 NOTE — TELEPHONE ENCOUNTER
Patient Contact    Attempt # 1    Was call answered?  No.  Left detailed message on secure voicemail with information from PCP below.    Kerrie Vanessa RN  [facility]

## 2024-04-26 NOTE — TELEPHONE ENCOUNTER
"CAR[facility] calling stating, \"I need to get a repeat blood draw for Lipid panel lab for tomorrow. I was unsuccessful today. I will need a PRN visit for tomorrow.\"      Please advise.   "

## 2024-04-30 ENCOUNTER — LAB (OUTPATIENT)
Dept: LAB | Facility: CLINIC | Age: 84
End: 2024-04-30
Payer: COMMERCIAL

## 2024-04-30 ENCOUNTER — MEDICAL CORRESPONDENCE (OUTPATIENT)
Dept: HEALTH INFORMATION MANAGEMENT | Facility: CLINIC | Age: 84
End: 2024-04-30

## 2024-04-30 DIAGNOSIS — D50.9 IRON DEFICIENCY ANEMIA, UNSPECIFIED IRON DEFICIENCY ANEMIA TYPE: ICD-10-CM

## 2024-04-30 DIAGNOSIS — E11.42 DM TYPE 2 WITH DIABETIC PERIPHERAL NEUROPATHY (H): ICD-10-CM

## 2024-04-30 DIAGNOSIS — E78.5 HYPERLIPIDEMIA LDL GOAL <100: ICD-10-CM

## 2024-04-30 LAB
CHOLEST SERPL-MCNC: 152 MG/DL
FASTING STATUS PATIENT QL REPORTED: NORMAL
FERRITIN SERPL-MCNC: 267 NG/ML (ref 31–409)
HBA1C MFR BLD: 6.2 % (ref 0–5.6)
HDLC SERPL-MCNC: 41 MG/DL
IRON BINDING CAPACITY (ROCHE): 231 UG/DL (ref 240–430)
IRON SATN MFR SERPL: 19 % (ref 15–46)
IRON SERPL-MCNC: 44 UG/DL (ref 61–157)
LDLC SERPL CALC-MCNC: 90 MG/DL
NONHDLC SERPL-MCNC: 111 MG/DL
TRIGL SERPL-MCNC: 104 MG/DL

## 2024-04-30 PROCEDURE — 83550 IRON BINDING TEST: CPT

## 2024-04-30 PROCEDURE — 82728 ASSAY OF FERRITIN: CPT

## 2024-04-30 PROCEDURE — 83540 ASSAY OF IRON: CPT

## 2024-04-30 PROCEDURE — 80061 LIPID PANEL: CPT

## 2024-04-30 PROCEDURE — 83036 HEMOGLOBIN GLYCOSYLATED A1C: CPT

## 2024-05-01 NOTE — RESULT ENCOUNTER NOTE
Cezar,    Your iron levels have improved. Continue your iron supplement.  Your cholesterol profile also improved so that is good to see.   Let us know if you have any questions.    Sapna Hernandez PA-C  Covering for Dr. Huynh

## 2024-05-02 ENCOUNTER — PATIENT OUTREACH (OUTPATIENT)
Dept: CARE COORDINATION | Facility: CLINIC | Age: 84
End: 2024-05-02
Payer: COMMERCIAL

## 2024-05-08 ENCOUNTER — MEDICAL CORRESPONDENCE (OUTPATIENT)
Dept: HEALTH INFORMATION MANAGEMENT | Facility: CLINIC | Age: 84
End: 2024-05-08

## 2024-05-14 ENCOUNTER — VIRTUAL VISIT (OUTPATIENT)
Dept: FAMILY MEDICINE | Facility: CLINIC | Age: 84
End: 2024-05-14
Payer: COMMERCIAL

## 2024-05-14 DIAGNOSIS — R55 VASOVAGAL SYNCOPE: ICD-10-CM

## 2024-05-14 DIAGNOSIS — J31.0 CHRONIC RHINITIS: ICD-10-CM

## 2024-05-14 DIAGNOSIS — I10 BENIGN ESSENTIAL HYPERTENSION: Primary | ICD-10-CM

## 2024-05-14 PROCEDURE — 99213 OFFICE O/P EST LOW 20 MIN: CPT | Mod: 95 | Performed by: INTERNAL MEDICINE

## 2024-05-14 RX ORDER — FLUTICASONE PROPIONATE 50 MCG
1 SPRAY, SUSPENSION (ML) NASAL DAILY
Qty: 10 ML | Refills: 1 | Status: SHIPPED | OUTPATIENT
Start: 2024-05-14 | End: 2024-09-04

## 2024-05-14 NOTE — PROGRESS NOTES
Cezar is a 83 year old who is being evaluated via a billable video visit.    How would you like to obtain your AVS? MyChart  If the video visit is dropped, the invitation should be resent by: Text to cell phone: [phone]  Will anyone else be joining your video visit? No          Subjective   Cezar is a 83 year old, presenting for the following health issues:  No chief complaint on file.    Via the Health Maintenance questionnaire, the patient has reported the following services have been completed -Eye Exam, this information has been sent to the abstraction team.    Video Start Time: 3:29 PM    This is an 83-year-old who I am seeing in follow-up to the virtual visit from April 9.  That was a hospital follow-up for syncope.  Since then he has had no further episodes of syncope and his blood pressure has been around 140 or so.  He can be just slightly lightheaded when he stands but has had this before and in fact it is better.  He has no chest pain or shortness of breath.  His only complaint is postnasal discharge despite the use of Atrovent which has been on for a long time.    Overall he is quite stable.  I do not think he is having any dysrhythmias and I think his blood pressure control is adequate.  I will add Flonase nasal spray and do a follow-up in 4 months but he will let me know if any problems prior to then.    Madi Huynh M.D.      Vitals:  No vitals were obtained today due to virtual visit.    Physical Exam   GENERAL: alert and no distress  EYES: Eyes grossly normal to inspection.  No discharge or erythema, or obvious scleral/conjunctival abnormalities.  RESP: No audible wheeze, cough, or visible cyanosis.    SKIN: Visible skin clear. No significant rash, abnormal pigmentation or lesions.  NEURO: Cranial nerves grossly intact.  Mentation and speech appropriate for age.  PSYCH: Appropriate affect, tone, and pace of words          Video-Visit Details    Type of service:  Video Visit   Video End  Time:3:41 PM  Originating Location (pt. Location): Home    Distant Location (provider location):  On-site  Platform used for Video Visit: Dean  Signed Electronically by: Madi Huynh MD

## 2024-05-16 ENCOUNTER — PATIENT OUTREACH (OUTPATIENT)
Dept: CARE COORDINATION | Facility: CLINIC | Age: 84
End: 2024-05-16
Payer: COMMERCIAL

## 2024-05-28 ENCOUNTER — MEDICAL CORRESPONDENCE (OUTPATIENT)
Dept: HEALTH INFORMATION MANAGEMENT | Facility: CLINIC | Age: 84
End: 2024-05-28

## 2024-06-11 DIAGNOSIS — Z53.9 DIAGNOSIS NOT YET DEFINED: Primary | ICD-10-CM

## 2024-06-11 PROCEDURE — G0179 MD RECERTIFICATION HHA PT: HCPCS | Performed by: INTERNAL MEDICINE

## 2024-07-09 ENCOUNTER — PATIENT OUTREACH (OUTPATIENT)
Dept: CARE COORDINATION | Facility: CLINIC | Age: 84
End: 2024-07-09
Payer: COMMERCIAL

## 2024-08-11 ENCOUNTER — HEALTH MAINTENANCE LETTER (OUTPATIENT)
Age: 84
End: 2024-08-11

## 2024-08-23 DIAGNOSIS — I10 BENIGN ESSENTIAL HYPERTENSION: ICD-10-CM

## 2024-08-23 RX ORDER — ATENOLOL 50 MG/1
50 TABLET ORAL 2 TIMES DAILY
Qty: 180 TABLET | Refills: 2 | Status: SHIPPED | OUTPATIENT
Start: 2024-08-23

## 2024-09-03 DIAGNOSIS — J31.0 CHRONIC RHINITIS: ICD-10-CM

## 2024-09-04 RX ORDER — FLUTICASONE PROPIONATE 50 MCG
1 SPRAY, SUSPENSION (ML) NASAL DAILY
Qty: 16 G | Refills: 1 | Status: SHIPPED | OUTPATIENT
Start: 2024-09-04

## 2024-09-04 NOTE — TELEPHONE ENCOUNTER
Medication passed protocol, however, refill RN could not approve because patient or pharmacy requested a 90 day supply, which is outside the RN protocol. Provider, please approve or deny the prescription.    Dana Cuevas RN  [facility] Triage Team

## 2024-09-17 ENCOUNTER — VIRTUAL VISIT (OUTPATIENT)
Dept: FAMILY MEDICINE | Facility: CLINIC | Age: 84
End: 2024-09-17
Payer: COMMERCIAL

## 2024-09-17 DIAGNOSIS — G89.29 CHRONIC JOINT PAIN: ICD-10-CM

## 2024-09-17 DIAGNOSIS — I10 BENIGN ESSENTIAL HYPERTENSION: Primary | ICD-10-CM

## 2024-09-17 DIAGNOSIS — E11.42 DM TYPE 2 WITH DIABETIC PERIPHERAL NEUROPATHY (H): ICD-10-CM

## 2024-09-17 DIAGNOSIS — D50.9 IRON DEFICIENCY ANEMIA, UNSPECIFIED IRON DEFICIENCY ANEMIA TYPE: ICD-10-CM

## 2024-09-17 DIAGNOSIS — M25.50 CHRONIC JOINT PAIN: ICD-10-CM

## 2024-09-17 DIAGNOSIS — E08.21 DIABETES MELLITUS DUE TO UNDERLYING CONDITION WITH DIABETIC NEPHROPATHY, WITHOUT LONG-TERM CURRENT USE OF INSULIN (H): ICD-10-CM

## 2024-09-17 DIAGNOSIS — E78.5 HYPERLIPIDEMIA LDL GOAL <100: ICD-10-CM

## 2024-09-17 DIAGNOSIS — E55.9 VITAMIN D DEFICIENCY: ICD-10-CM

## 2024-09-17 PROCEDURE — 99214 OFFICE O/P EST MOD 30 MIN: CPT | Mod: 95 | Performed by: INTERNAL MEDICINE

## 2024-09-17 PROCEDURE — G2211 COMPLEX E/M VISIT ADD ON: HCPCS | Mod: 95 | Performed by: INTERNAL MEDICINE

## 2024-09-17 NOTE — PATIENT INSTRUCTIONS
I would get the flu shot, tetanus and shingles shot, and also the covid vaccine.  I would also get the rsv vaccine in about 2 months.    Please have homecare do blood work that I have ordered.

## 2024-12-10 DIAGNOSIS — J31.0 CHRONIC RHINITIS: ICD-10-CM

## 2024-12-11 RX ORDER — FLUTICASONE PROPIONATE 50 MCG
1 SPRAY, SUSPENSION (ML) NASAL DAILY
Qty: 48 G | Refills: 1 | Status: SHIPPED | OUTPATIENT
Start: 2024-12-11

## 2024-12-22 ENCOUNTER — HEALTH MAINTENANCE LETTER (OUTPATIENT)
Age: 84
End: 2024-12-22

## 2025-02-03 NOTE — TELEPHONE ENCOUNTER
Axis (Degrees)            31                              R Axis (Degrees)            -18                             T Axis (Patient's wife called back to check on script.    She would like a call back as to what they should do [facility]    Please call [phone]   

## 2025-02-20 NOTE — PROGRESS NOTES
[facility] Discharge Summary   Patient ID:   Praveen Alva   [id number]   82 year old   1942   Admit date: 2/14/2025   Discharge date: 2/20/2025   Admitting Physician: Hermilo Brown MD   Discharge Physician: Erin Harrington MD     Primary Diagnoses:   Principal Problem:    Duodenal ulcer hemorrhage  Active Problems:    Long term (current) use of anticoagulants    PAF (paroxysmal atrial fibrillation)  (CMD)    Acute GI bleeding    Acute blood loss anemia    Iron deficiency anemia due to chronic blood loss    Acute on chronic combined systolic (congestive) and diastolic (congestive) heart failure (CMD)    Gastrointestinal hemorrhage with melena    CKD (chronic kidney disease)    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  (CMD)    Duodenitis hemorrhagic    Acute superficial gastritis with hemorrhage  Resolved Problems:    Thrombocytopenia (CMD)    Nontraumatic hemorrhagic shock  (CMD)    Biventricular heart failure  (CMD)     Secondary Diagnoses:   Past Medical History:   Diagnosis Date    AF (atrial fibrillation)  (CMD)     Anemia     had Hgb 5.5 on 7-29-20- had to get 5 units PRBC's- was on coumadin but this has been discontinued    Aortic stenosis     Biventricular heart failure  (CMD) 02/14/2025    Chronic renal failure, stage 3a  (CMD) 09/21/2021    Congestive cardiac failure  (CMD)     DJD (degenerative joint disease)     knees and spine    Gallstone 03/14/2024    History of skin cancer     Hypertension     Nontraumatic hemorrhagic shock  (CMD) 02/14/2025    Obesity     Pneumonia     Severe obesity (BMI >= 40)  (CMD) 09/21/2021    Skin cancer 08/2020    left lower eye lid    Sleep apnea     CPAP    Thrombocytopenia (CMD) 09/21/2021        Hospital Course:    Praveen Alva is a 82 year old female with PmHx of HTN, aortic stenosis, A-fib on Eliquis, CHF who presented to the ED 2/14 for evaluation of frequent bloody stools that occurred night prior to admission.  On the day of admission she felt dizzy  Cezar is a 84 year old who is being evaluated via a billable video visit.    How would you like to obtain your AVS? MyChart  If the video visit is dropped, the invitation should be resent by: Text to cell phone: [phone]  Will anyone else be joining your video visit? Yes: wife Madhuri and daughter. How would they like to receive their invitation? Text to cell phone: [phone]          Subjective   Cezar is a 84 year old, presenting for the following health issues:  Follow Up      Video Start Time: 3:24 PM    This is a video visit in follow-up to our May 14 visit.  As noted, in March the patient was hospitalized for syncope which was felt to be vasovagal, he has had none since then. When I saw him in May he was doing quite well.  He was having some postnasal discharge despite the use of Atrovent so I added Flonase.  During his hospital stay in March he had a left bundle branch block with suspected Mobitz type II block.  He had an echo that was normal with only mild MR.  He had bilateral calcified and noncalcified lung nodules for which outpatient follow-up was recommended.  Follow-up in 1 year is recommended if higher risk.  Carotid imaging showed no stenosis.  His hydrochlorothiazide was stopped during the hospital stay as they felt it might be contributing to the syncope.  He was found to have iron deficiency anemia which I felt was likely due to Advil use. Follow-up lab testing showed his iron to be normal and a hemoglobin A1c of 6.2.  His lipid panel look fine.  No hemoglobin was done.    The patient also has diabetes for which he is not on medication.  He does not check his sugars.  No sores on his feet or toes.  No chest pain or shortness of breath or GI symptoms.    He only checked his blood pressure once since last visit and was 149/72.    He is taking iron every other day, Advil or 3 a day.  He has chronic joint pains.    We discussed immunizations and have strongly recommended them.  He will consider  every time she tried to stand up and so called EMS.     In the ED, patient afebrile, nontachycardic, mildly hypotensive to BP 98/52 and saturating well on room air.  Labs significant for , K4.3, HCO3 22, SCR 1.31.  BNP 2007 and 25.  WBC 7.4, Hgb 6.4, PLT 64.  FOBT stool positive.  She was given Kcentra in the ED for reversal of her Eliquis and given IV Protonix, discussed with GI.  Patient was initially admitted to medicine service for further workup and management.     While in the medicine service patient developed BRBPR and significant hypotension to a MAP of 56 and subsequently transferred to ICU for further workup and management. Patient received 1 unit PRBCs in the ED, and an additional 1 unit PRBCs after significant BRBPR and hypotension. She also received a total of 2u PLT for her level of 64.      Status post EGD 2/14 which showed duodenal ulcer x 2.  GI recommending PPI twice daily for 2 weeks and then daily.  Per GI okay to resume ASA and Eliquis 2/21.    Cardiology recommending: metolazone 2.5mg twice weekly in addition to oral bumex 2mg daily. Our HF team will follow this patient and once she is discharged from rehab, we can go back to previous dosing of IV bumex twice weekly.        Consults:  IP Consult Orders (From admission, onward)       Start     Ordered    02/17/25 1645  Inpatient consult to Hematology  ONE TIME        Comments: Notified Dr. Don   Provider:  Maria De Jesus Mcleod MD    02/17/25 1645    02/14/25 1411  Inpatient consult to GI  ONE TIME        Provider:  Warren Cisneros MD    02/14/25 1410    02/14/25 1400  Inpatient consult to Wound Care Medical Provider  (IP Consult to Wound Care Medical Provider Panel)  ONE TIME        Provider:  Baldomero Rodríguez MD    02/14/25 1359    02/14/25 1222  Inpatient consult to GI  ONE TIME        Provider:  Warren Cisneros MD    02/14/25 1222                     Procedures performed      XR CHEST AP OR PA  Result Date: 2/15/2025  Stable mild  it.  He is up-to-date on his eye exam.  He has had no fainting.  He says some wooziness which he attributes to his cataract and vision issues.        Objective           Vitals:  No vitals were obtained today due to virtual visit.    Physical Exam   GENERAL: alert and no distress  EYES: Eyes grossly normal to inspection.  No discharge or erythema, or obvious scleral/conjunctival abnormalities.  RESP: No audible wheeze, cough, or visible cyanosis.    SKIN: Visible skin clear. No significant rash, abnormal pigmentation or lesions.  NEURO: Cranial nerves grossly intact.  Mentation and speech appropriate for age.  PSYCH: Appropriate affect, tone, and pace of words    ASSESSMENT:  Hypertension, controlled okay, do not want to shoot for too tight of control with possibility of syncope  Diabetes within nephropathy, on ACE inhibitor, will get follow-up labs  Peripheral neuropathy, due to diabetes  Iron deficiency anemia, likely due to NSAIDs, doubt malignant cause, would be difficult for him to evaluate this further with endoscopies  Chronic joint pain  Vitamin D deficiency  Hyperlipidemia  Healthcare maintenance    PLAN:  I have ordered home care as he is homebound.  Hopefully they can do labs, vaccines and assess his overall situation.  He will call if changes  Follow-up by video visit in 4 months    Madi Huynh M.D.        Video-Visit Details    Type of service:  Video Visit   Video End Time:3:35 PM  Originating Location (pt. Location): Home    Distant Location (provider location):  On-site  Platform used for Video Visit: Dean  Signed Electronically by: Madi Huynh MD     CHF/pulmonary edema. Trace left pleural effusion.       XR CHEST AP OR PA  Result Date: 2/14/2025  Interval placement of a right IJ catheter with its tip in the expected location of the atrial caval junction. No pneumothorax. No consolidation. Small left pleural effusion has developed in the interval. Mediastinum and the shruthi are within normal limits for technique Cardiac enlargement with stable prominence of the central pulmonary arteries. Postop changes sternotomy. Remainder the exam is stable      TRANSTHORACIC ECHO (TTE) LIMITED W/ W/O IMAGING AGENT  Result Date: 2/14/2025  Final Impressions   * Limited study - LV.   * Normal left ventricular systolic function.   * No focal left ventricular regional wall motion abnormalities.   * Right ventricle is not well visualized. Appears both mildly dilated and hypokinetic in limited views.   * There is a prosthetic valve in the aortic position with a mean gradient of 13 mmHg and likely mild insufficiency not fully evaluated.   * There is a prosthetic valve in the mitral position with a mean gradient of 11 mmHg at a heart rate of 93 bpm and trace to mild insufficiency.   * Mild tricuspid valve regurgitation.   * Pulmonary artery systolic pressure; 60 mmHg.           Discharge Exam   Blood pressure 99/58, pulse 83, temperature 97.4 °F (36.3 °C), temperature source Oral, resp. rate 16, height 5' 4\" (1.626 m), weight 115.3 kg (254 lb 3.1 oz), SpO2 96%, not currently breastfeeding.   Pleasant, cooperative and in no apparent distress   Moist mucous membranes, oropharynx is clear  Neck is supple without lymphadenopathy or masses  Cardiac with regular rate and rhythm and no murmurs   Lungs are clear without crackles or wheeze  Abdomen is soft, non tender, without masses and bowel sounds are present  Lower extremities with no edema, calves are non tender with palpation  Skin is warm without rash on exposed area  Mood is upbeat  Alert and oriented to person, place and time        Activity: as tolerated    Diet:   Dietary Orders (From admission, onward)       Start     Ordered    02/19/25 0844  Cardiac; Yes, Medical Nutrition Management by RD (Registered Dietitian); Fluid Restrict 1800 mL (1020 from Dietary) Diet  DIET EFFECTIVE NOW        References:    [facility] Food and Nutrition Services Medical Nutrition Therapy   Question Answer Comment   Diet Modifiers Cardiac    Medical Nutrition Management by RD (Registered Dietitian) Yes, Medical Nutrition Management by RD (Registered Dietitian)    Fluid Restriction Modifier Fluid Restrict 1800 mL (1020 from Dietary)        02/19/25 0844    02/18/25 1433  2 Times/Day w Breakfast & Dinner; Ensure Clear Generic/Clear Liquid Supplement (apple flavor at breakfast and berry flavor at dinner) Oral Nutrition Supplement  As Directed        Question Answer Comment   Frequency 2 Times/Day w Breakfast & Dinner    Oral Supplement Ensure Clear Generic/Clear Liquid Supplement apple flavor at breakfast and berry flavor at dinner       02/18/25 1433    02/18/25 1433  Daily w Lunch; ProSource Gelatein 20/Gelatin, High Protein, Sugar Free, Fruit Punch Oral Nutrition Supplement  As Directed        Question Answer Comment   Frequency Daily w Lunch    Oral Supplement ProSource Gelatein 20/Gelatin, High Protein, Sugar Free, Fruit Punch        02/18/25 1433                      Code Status: Selective Treatment/DNR     Pending issues to be followed up by PCP:      Hemorrhagic Shock secondary to GI bleed from duodenal ulcer              [facility] PTA (held)- resume per GI on 2/21             K centra given             Protonix BID x2 weeks, then daily              S/p EGD 2/14             If re bleeding occurs, consult IR for angiogram and possible embolization     Acute blood loss anemia             Hgb > 7 goal             S/p 2u pRBC             Iron studies/ folate, started iv iron per heme while inpatient      Paroxysmal A fib             [facility] PTA-held until  2/21             Telemetry     HFrEF             ECHO LVEF 57%              Resume bumex 2 mg po qd, usually on iv infusion twice weekly                   While unable to receive iv bumex plan for metolazone 2.5 mg po twice weekly in addition to regular home bumex 2 mg po qd  - plan to resume iv bumex after discharge home from rehab              Limited GDMT due to hypotension-metoprolol tartrate 12.5 mg po bid              Cardiology consulted to assist with diuretic regimen while patient at SNF (won't be able to have bumex infusion) - added metolazone twice weekly for duration of SNF stay     Chronic hypotension             Resumed midodrine 5 mg po tid      Severe pulm htn             Resumed diuretics       Discharge medication list:  Refer to AVS for full list of discharge medications.      Disposition: patient is being discharged to [facility]     Follow-up with:   Saw Dickson DO  [address]  [place] [address]  [phone]    Follow up in 3 day(s)  post hospital follow up within 3 days of being discharged.    Maryam Ward APNP  [address]  [facility] [address]  [facility] [address]  [phone]    Follow up in 7 day(s)  post hospital follow up within 7 days of being discharged    [facility]  [address] [facility] [address] [phone]           Time spent on discharge was greater than 30 minutes   Signed:   Erin Harrington MD   2/20/2025   12:00 PM   I certify that post-hospital extended care is required on an inpatient basis because of the above-named patient's need for 24-hour skilled nursing care or other rehabilitation services for the condition(s) for which he/she was treated in the hospital prior to transfer.    Patient is free from communicable diseases:  Yes    Patient Discharge Instructions/Orders:   See AVS for discharge medications  Activity:  as tolerated   Weight Bearing Status:  as tolerated   Therapy:  PT Evaluation & Treatment and  OT Evaluation & Treatment  Daily Weights   Diet:  2 Times/Day W Breakfast & Dinner; Ensure Clear Generic/Clear Liquid Supplement (Apple Flavor at Breakfast and Berry Flavor at Dinner) Oral Nutrition Supplement  Daily W Lunch; Prosource Gelatein 20/Gelatin, High Protein, Sugar Free, Fruit Punch Oral Nutrition Supplement  Cardiac; Yes, Medical Nutrition Management by Rd (Registered Dietitian); Fluid Restrict 1800 Ml (1020 From Dietary) Diet  Aguila Catheter: No  Central Line/PICC Line Care: Insertion Date: none     Respiratory:   room air  Labs:  CBC / BMP 3 days   See AVS discharge instructions for follow-up appointment(s).

## 2025-03-04 ENCOUNTER — VIRTUAL VISIT (OUTPATIENT)
Dept: FAMILY MEDICINE | Facility: CLINIC | Age: 85
End: 2025-03-04
Payer: COMMERCIAL

## 2025-03-04 DIAGNOSIS — E08.21 DIABETES MELLITUS DUE TO UNDERLYING CONDITION WITH DIABETIC NEPHROPATHY, WITHOUT LONG-TERM CURRENT USE OF INSULIN (H): Primary | ICD-10-CM

## 2025-03-04 DIAGNOSIS — D50.9 IRON DEFICIENCY ANEMIA, UNSPECIFIED IRON DEFICIENCY ANEMIA TYPE: ICD-10-CM

## 2025-03-04 DIAGNOSIS — E55.9 VITAMIN D DEFICIENCY: ICD-10-CM

## 2025-03-04 DIAGNOSIS — R91.8 PULMONARY NODULES: ICD-10-CM

## 2025-03-04 DIAGNOSIS — E78.5 HYPERLIPIDEMIA LDL GOAL <100: ICD-10-CM

## 2025-03-04 DIAGNOSIS — G89.29 CHRONIC JOINT PAIN: ICD-10-CM

## 2025-03-04 DIAGNOSIS — I10 BENIGN ESSENTIAL HYPERTENSION: ICD-10-CM

## 2025-03-04 DIAGNOSIS — M25.50 CHRONIC JOINT PAIN: ICD-10-CM

## 2025-03-04 DIAGNOSIS — G62.9 PERIPHERAL POLYNEUROPATHY: ICD-10-CM

## 2025-03-04 PROCEDURE — 98006 SYNCH AUDIO-VIDEO EST MOD 30: CPT | Performed by: INTERNAL MEDICINE

## 2025-03-04 RX ORDER — AMLODIPINE BESYLATE 5 MG/1
5 TABLET ORAL DAILY
Qty: 90 TABLET | Refills: 3 | Status: SHIPPED | OUTPATIENT
Start: 2025-03-04

## 2025-03-04 RX ORDER — ATENOLOL 50 MG/1
50 TABLET ORAL 2 TIMES DAILY
Qty: 180 TABLET | Refills: 2 | Status: SHIPPED | OUTPATIENT
Start: 2025-03-04

## 2025-03-04 RX ORDER — LISINOPRIL 10 MG/1
10 TABLET ORAL DAILY
Qty: 90 TABLET | Refills: 3 | Status: SHIPPED | OUTPATIENT
Start: 2025-03-04

## 2025-03-04 NOTE — PROGRESS NOTES
Cezar is a 84 year old who is being evaluated via a billable video visit.    How would you like to obtain your AVS? Mail a copy  If the video visit is dropped, the invitation should be resent by: Text to cell phone: [phone]  Will anyone else be joining your video visit? Yes: daughter. How would they like to receive their invitation? Text to cell phone: [phone]          Subjective   Cezar is a 84 year old, presenting for the following health issues:    This is a video visit for this 84-year-old with multiple medical problems.  His last visit with me was a virtual visit on September 17 which are reviewed.  As noted the patient was hospitalized in March for syncope felt to be vasovagal and has had none since then.  He did have some gastroenteritis at that time.  I did a follow-up video visit in May at which time he was doing well.  He noted postnasal discharge despite Atrovent so I added Flonase.  During his hospital stay noncalcified and calcified lung nodules were followed for which outpatient imaging was recommended in 1 year.  Hydrochlorothiazide was stopped during the hospital stay as they thought it might be contributing to syncope.  He was found to have iron deficiency anemia which I felt was likely due to Advil use.  Follow-up testing showed his iron to be normal and a hemoglobin A1c of 6.2.    The patient has diabetes and is not on medication and does not check his sugars.  He has no sores on his feet or toes.    He has hypertension.  At the September virtual visit I noted we did not want to shoot for too tight of control given the possibility of syncope.  They have been checking the blood pressure at home and typically it is in the 150s.    His biggest issue is his ongoing arthritis with severe pain.  It is at multiple areas.  Send his knees, back and swollen the feet it makes it hard for him to do much.  He actually has not been out of his house in a couple of years.    He is not having chest  pain or shortness of breath, GI symptoms.      Follow Up      3/4/2025     3:01 PM   Additional Questions   Roomed by Cindy KRISHNAMURTHY MA   Accompanied by Madhuri, wife and Laura daughter     Video Start Time: 3:46 PM        Vitals:  No vitals were obtained today due to virtual visit.    Physical Exam   GENERAL: alert and no distress  EYES: Eyes grossly normal to inspection.  No discharge or erythema, or obvious scleral/conjunctival abnormalities.  RESP: No audible wheeze, cough, or visible cyanosis.    SKIN: Visible skin clear. No significant rash, abnormal pigmentation or lesions.  NEURO: Cranial nerves grossly intact.  Mentation and speech appropriate for age.  PSYCH: Appropriate affect, tone, and pace of words    ASSESSMENT:  Diabetes with nephropathy and neuropathy, not on medications, needs follow-up labs.  His wife checks his feet daily and she attests that there is no sores  Hypertension, control not great, he is hesitant to go back on hydrochlorothiazide.  He wants to work on diet and weight loss  Chronic joint pain, severe, using Advil, I recommend occasional Tylenol  Iron deficiency anemia, cannot entirely exclude malignant cause but I think it is unlikely and suspect it is due to his Advil.  Will check labs.  He is on iron every other day.  Vitamin D deficiency and hyperlipidemia, hopefully we can get labs soon  Incidental pulmonary nodules, he is not able to do another CT scan  Healthcare maintenance    PLAN:  Home care eval, hopefully they can go out and assess him, his blood pressure do labs and hopefully vaccinations  Will leave the medications the same for now  Follow-up virtual visit in 6 months or as needed    Madi Huynh M.D.    21 minutes on the day of the encounter doing chart review, history and exam, documentation and further activities as noted above.        Video-Visit Details    Type of service:  Video Visit   Video End Time:4:02 PM  Originating Location (pt. Location): Home    Distant Location  (provider location):  On-site  Platform used for Video Visit: Dean  Signed Electronically by: Madi Huynh MD

## 2025-05-14 ENCOUNTER — TELEPHONE (OUTPATIENT)
Dept: FAMILY MEDICINE | Facility: CLINIC | Age: 85
End: 2025-05-14

## 2025-05-14 ENCOUNTER — VIRTUAL VISIT (OUTPATIENT)
Dept: FAMILY MEDICINE | Facility: CLINIC | Age: 85
End: 2025-05-14
Payer: COMMERCIAL

## 2025-05-14 DIAGNOSIS — R30.0 DYSURIA: Primary | ICD-10-CM

## 2025-05-14 PROCEDURE — 98013 SYNCH AUDIO-ONLY EST LOW 20: CPT | Performed by: PHYSICIAN ASSISTANT

## 2025-05-14 RX ORDER — SULFAMETHOXAZOLE AND TRIMETHOPRIM 800; 160 MG/1; MG/1
1 TABLET ORAL 2 TIMES DAILY
Qty: 14 TABLET | Refills: 0 | Status: SHIPPED | OUTPATIENT
Start: 2025-05-14

## 2025-05-14 NOTE — PROGRESS NOTES
Cezar is a 84 year old who is being evaluated via a billable telephone visit.    What phone number would you like to be contacted at? [phone]  How would you like to obtain your AVS? Shanta  Originating Location (pt. Location): Home    Distant Location (provider location):  On-site  Telephone visit completed due to the patient did not have access to video, while the distant provider did.    Assessment and Plan:     (R30.0) Dysuria  (primary encounter diagnosis)  Comment: onset in last 1.5 weeks, has had dysuria, frequency and urgency, denies fever/chills, abdominal pain, rectal pain, back pain, symptoms have waxed and waned but more constant in last few days, he has very limited mobility and is unable to give a urine sample today  Plan: sulfamethoxazole-trimethoprim (BACTRIM DS)         800-160 MG tablet        Start abx today  Push fluids  Discussed unable to get culture w/out urine sample so if symptoms don't resolve with above will need in person eval  Discussed s/sxs systemic illness and when to be seen promptly     SYLVIA Mercado Same Day Provider     Subjective   Cezar is a 84 year old, presenting for the following health issues:  Urinary Problem (For one week)      5/14/2025    10:22 AM   Additional Questions   Roomed by Fany     History of Present Illness       Reason for visit:  Urinary problem   He is taking medications regularly.        Mr. Lockhart is seeing me via phone visit for urinary symptoms  Onset  He is having dysuria, frequency, urgency  Symptoms started about 1.5 weeks ago, have waxed and waned  He denies fever/chills, nausea/vomiting, back pain, rectal pain  He cannot come for urine check today, difficult for him to manage transportation     Has remote Hx UTI years ago        Objective           Vitals:  No vitals were obtained today due to virtual visit.    Physical Exam   General: Alert and no distress //Respiratory: No audible wheeze, cough, or shortness of breath  // Psychiatric:  Appropriate affect, tone, and pace of words          Phone call duration: 7 minutes  Signed Electronically by: Nupur Felix PA-C

## 2025-05-14 NOTE — TELEPHONE ENCOUNTER
Symptoms    Describe your symptoms: Peeing a lot and it burns    Any pain: Yes:     How long have you been having symptoms: 1 week    Have you been seen for this:  Yes:     Appointment offered?: Yes: he can't come in    Triage offered?: Yes: she was on hold for 25 mns and nobody answered    Home remedies tried:     Preferred Pharmacy:   [id number] [place] [address] & [address] [address]  Phone: [phone] Fax: [phone]      Could we send this information to you in [facility] or would you prefer to receive a phone call?:   Patient would prefer a phone call if it goes to answering machine, hang up and try back and somone will answer  Okay to leave a detailed message?: No at Home number on file [phone] (home)

## 2025-05-21 ENCOUNTER — TELEPHONE (OUTPATIENT)
Dept: FAMILY MEDICINE | Facility: CLINIC | Age: 85
End: 2025-05-21
Payer: COMMERCIAL

## 2025-05-21 DIAGNOSIS — R30.0 DYSURIA: Primary | ICD-10-CM

## 2025-05-21 NOTE — TELEPHONE ENCOUNTER
To PCP:    Patient had a VV with team Provider on 05/14 and was unable to provide urine sample at that time. Prescribed Bactrim.    Symptoms worsening and Patient's wife would like to bring in a urine sample for Patient.    Please advise, thank you.    Shavonne COLEMAN,  Triage RN  [facility]

## 2025-05-21 NOTE — TELEPHONE ENCOUNTER
General Call    Contacts       Contact Date/Time Type Contact Phone/Fax    05/21/2025 03:34 PM CDT Phone (Incoming) Madhuri Lockhart (Emergency Contact) [phone] (H)     Bringing a urine sample for           Reason for Call: Wife is calling to report  is not better after antibiotics and would like to bring in a urine sample for more testing    What are your questions or concerns:  Just want to make sure if he needs anything else depending on the results    Date of last appointment with provider: 3/25/25    Could we send this information to you in [facility] or would you prefer to receive a phone call?:   Patient would prefer a phone call   Okay to leave a detailed message?: No at Cell number on file:    Telephone Information:   Mobile [phone]

## 2025-05-22 ENCOUNTER — LAB (OUTPATIENT)
Dept: LAB | Facility: CLINIC | Age: 85
End: 2025-05-22
Payer: COMMERCIAL

## 2025-05-22 ENCOUNTER — DOCUMENTATION ONLY (OUTPATIENT)
Dept: LAB | Facility: CLINIC | Age: 85
End: 2025-05-22

## 2025-05-22 DIAGNOSIS — Z00.00 ROUTINE GENERAL MEDICAL EXAMINATION AT A HEALTH CARE FACILITY: Primary | ICD-10-CM

## 2025-05-22 DIAGNOSIS — R30.0 DYSURIA: Primary | ICD-10-CM

## 2025-05-22 DIAGNOSIS — R30.0 DYSURIA: ICD-10-CM

## 2025-05-22 LAB
ALBUMIN UR-MCNC: 100 MG/DL
APPEARANCE UR: CLEAR
BACTERIA #/AREA URNS HPF: ABNORMAL /HPF
BILIRUB UR QL STRIP: NEGATIVE
COLOR UR AUTO: YELLOW
GLUCOSE UR STRIP-MCNC: NEGATIVE MG/DL
HGB UR QL STRIP: NEGATIVE
HOLD SPECIMEN: NORMAL
KETONES UR STRIP-MCNC: NEGATIVE MG/DL
LEUKOCYTE ESTERASE UR QL STRIP: NEGATIVE
MUCOUS THREADS #/AREA URNS LPF: PRESENT /LPF
NITRATE UR QL: NEGATIVE
PH UR STRIP: 6 [PH] (ref 5–7)
RBC #/AREA URNS AUTO: ABNORMAL /HPF
SP GR UR STRIP: 1.02 (ref 1–1.03)
SQUAMOUS #/AREA URNS AUTO: ABNORMAL /LPF
UROBILINOGEN UR STRIP-ACNC: 0.2 E.U./DL
WBC #/AREA URNS AUTO: ABNORMAL /HPF

## 2025-05-22 PROCEDURE — 81001 URINALYSIS AUTO W/SCOPE: CPT

## 2025-05-22 PROCEDURE — 87086 URINE CULTURE/COLONY COUNT: CPT

## 2025-05-22 RX ORDER — CIPROFLOXACIN 250 MG/1
250 TABLET, FILM COATED ORAL 2 TIMES DAILY
Qty: 14 TABLET | Refills: 0 | Status: SHIPPED | OUTPATIENT
Start: 2025-05-22

## 2025-05-22 NOTE — TELEPHONE ENCOUNTER
Writer contacted Madhuri, spouse, and relayed provider message below~  Madhuri expressed understanding and agrees to recommendations.

## 2025-05-22 NOTE — TELEPHONE ENCOUNTER
His UA does suggest a UTI so I sent in a prescription for Cipro.  Please make sure he is not allergic to it.  He should stop the Septra.  Take 1 twice a day.  He should be better within 2 days.  If he worsens he should go to the ER.    Madi Huynh M.D.

## 2025-05-22 NOTE — TELEPHONE ENCOUNTER
This should be triage.  The patient was just on Bactrim.  Unfortunately if he just finished the Bactrim the urine may not show anything but certainly we could check a urine culture.  Please triage though.    Madi Huynh M.D.

## 2025-05-22 NOTE — TELEPHONE ENCOUNTER
Madhuri Perkins Dr. states that Cezar's symptoms include: increased urinary frequency and discomfort with urination    Denies hematuria, abdominal pain, fever, malodor, back pain, disorientation, cloudy urine

## 2025-05-23 ENCOUNTER — RESULTS FOLLOW-UP (OUTPATIENT)
Dept: FAMILY MEDICINE | Facility: CLINIC | Age: 85
End: 2025-05-23
Payer: COMMERCIAL

## 2025-05-23 LAB — BACTERIA UR CULT: NORMAL

## 2025-05-23 NOTE — RESULT ENCOUNTER NOTE
Good morning,    Your urine culture did not grow anything but this can be simply because you have been on antibiotics.  Please let me know if the current antibiotic is not taking care of your urine symptoms.    Madi Huynh M.D.

## 2025-06-02 NOTE — TELEPHONE ENCOUNTER
Patient and his wife both given message below from Dr. Huynh.     Patient states that he has no way of getting into the clinic. States that he can't sit. He always recline in recliner due to bad back. States that his limbs, legs and feet are goofy due to history of Polio. States that it is unbelievable for him to move.    Patient would have to call for an ambulance for non-emergency transport to the clinic or hospital. Patient is wishing for virtual visit if at all possible. Martha Castellanos RN

## 2025-06-02 NOTE — TELEPHONE ENCOUNTER
Triage Patient Outreach    Attempt # 1    Was call answered?  No.  Unable to leave message, recall needed. Phone was answered and the hung up. Redial needed.     Kelly Chang RN

## 2025-06-02 NOTE — TELEPHONE ENCOUNTER
Outgoing call to Patient.  RN spoke with both Patient and his wife. RN assisted Patient in getting scheduled for a VV with PCP tomorrow as requested as follows:    Appointments in Next Year      Jun 03, 2025 1:30 PM  (Arrive by 1:25 PM)  Provider Visit with Madi Huynh MD  [facility] ([facility] ) [phone]          Shreyas Devine RN  [facility]

## 2025-06-02 NOTE — TELEPHONE ENCOUNTER
Patient finished Cipro antibiotic on Saturday.  Patient has urinary frequency, urgency and burning again.  Wife is requesting antibiotic refill.  She denies abdominal/back or hematuria.  Please advise if patient should start e-visit, leave a urine sample or schedule an appointment to discuss UTI antibiotic renewal.  Thank you.

## 2025-06-03 ENCOUNTER — VIRTUAL VISIT (OUTPATIENT)
Dept: FAMILY MEDICINE | Facility: CLINIC | Age: 85
End: 2025-06-03
Payer: COMMERCIAL

## 2025-06-03 ENCOUNTER — LAB (OUTPATIENT)
Dept: LAB | Facility: CLINIC | Age: 85
End: 2025-06-03
Payer: COMMERCIAL

## 2025-06-03 DIAGNOSIS — R30.0 DYSURIA: ICD-10-CM

## 2025-06-03 DIAGNOSIS — Z00.00 ROUTINE GENERAL MEDICAL EXAMINATION AT A HEALTH CARE FACILITY: Primary | ICD-10-CM

## 2025-06-03 DIAGNOSIS — R30.0 DYSURIA: Primary | ICD-10-CM

## 2025-06-03 DIAGNOSIS — R35.0 URINARY FREQUENCY: ICD-10-CM

## 2025-06-03 LAB
ALBUMIN UR-MCNC: >=300 MG/DL
APPEARANCE UR: CLEAR
BACTERIA #/AREA URNS HPF: ABNORMAL /HPF
BILIRUB UR QL STRIP: NEGATIVE
COLOR UR AUTO: YELLOW
GLUCOSE UR STRIP-MCNC: NEGATIVE MG/DL
HGB UR QL STRIP: NEGATIVE
KETONES UR STRIP-MCNC: NEGATIVE MG/DL
LEUKOCYTE ESTERASE UR QL STRIP: NEGATIVE
NITRATE UR QL: NEGATIVE
PH UR STRIP: 5.5 [PH] (ref 5–7)
RBC #/AREA URNS AUTO: ABNORMAL /HPF
SP GR UR STRIP: 1.02 (ref 1–1.03)
SQUAMOUS #/AREA URNS AUTO: ABNORMAL /LPF
UROBILINOGEN UR STRIP-ACNC: 0.2 E.U./DL
WBC #/AREA URNS AUTO: ABNORMAL /HPF

## 2025-06-03 PROCEDURE — 87086 URINE CULTURE/COLONY COUNT: CPT

## 2025-06-03 PROCEDURE — 81001 URINALYSIS AUTO W/SCOPE: CPT

## 2025-06-03 PROCEDURE — 98005 SYNCH AUDIO-VIDEO EST LOW 20: CPT | Performed by: INTERNAL MEDICINE

## 2025-06-03 RX ORDER — NITROFURANTOIN 25; 75 MG/1; MG/1
100 CAPSULE ORAL 2 TIMES DAILY
Qty: 10 CAPSULE | Refills: 0 | Status: SHIPPED | OUTPATIENT
Start: 2025-06-03

## 2025-06-03 NOTE — PROGRESS NOTES
Cezar is a 84 year old who is being evaluated via a billable video visit.    How would you like to obtain your AVS? MyChart  If the video visit is dropped, the invitation should be resent by: Text to cell phone: [phone]  Will anyone else be joining your video visit? No          Subjective   Cezar is a 84 year old, presenting for the following health issues:    Last uti 10 years ago.    The patient did virtual visit with Samara 5/14/25 due to urine symptoms of 1.5 weeks which were frequency and urgency and dysuria.  Took bactrim and did not do much per patient.  Then called and brought in sample, ua positive but uc negative and given cipro for 7 days on May 22, cipro 250mg bid for 7 days.  This helped the frequency significantly.  Then finished the antibiotic and has symptoms again.  He notes a pressure/hurting feeling in the genital area, and feels like needs to go often, and now some more frequency and increased nocturia, although last night was better.    He feels something when urinates but not a burning or stinging.  No blood or pus in urine.  No fever or ns, no n/v.  No bowel movement issues.  No abdomen pain.  His last day of cipro was Thursday the 29th.  No focal abdominal pain, no bowel symptoms.  No nausea or vomiting.  No fevers or night sweats.    ASSESSMENT:  Recurrent urinary symptoms, improved with antibiotics but then returned.  Etiology unclear.  This may be UTI but needs further evaluation.  We need to check his urine and he is 5 days out now so hopefully the urine culture is meaningful.  If the urine culture is negative and he continues to have symptoms we will need to image with CT and send him to urology.    PLAN:  He will bring in a sterile urine sample today for UA and UC  Will start Macrobid 1 twice daily for 5 days  If culture is negative we will need to evaluate further.    Madi Huynh M.D.    The longitudinal plan of care for the diagnosis(es)/condition(s) as documented were  addressed during this visit. Due to the added complexity in care, I will continue to support Cezar in the subsequent management and with ongoing continuity of care.          UTI      Video Start Time: 1:38 PM        Vitals:  No vitals were obtained today due to virtual visit.    Physical Exam   GENERAL: alert and no distress  EYES: Eyes grossly normal to inspection.  No discharge or erythema, or obvious scleral/conjunctival abnormalities.  RESP: No audible wheeze, cough, or visible cyanosis.    SKIN: Visible skin clear. No significant rash, abnormal pigmentation or lesions.  NEURO: Cranial nerves grossly intact.  Mentation and speech appropriate for age.  PSYCH: Appropriate affect, tone, and pace of words          Video-Visit Details    Type of service:  Video Visit   Video End Time:1:51 PM  Originating Location (pt. Location): Home    Distant Location (provider location):  On-site  Platform used for Video Visit: Dean  Signed Electronically by: Madi Huynh MD

## 2025-06-05 ENCOUNTER — RESULTS FOLLOW-UP (OUTPATIENT)
Dept: FAMILY MEDICINE | Facility: CLINIC | Age: 85
End: 2025-06-05

## 2025-06-05 LAB — BACTERIA UR CULT: NO GROWTH

## 2025-07-06 ENCOUNTER — HEALTH MAINTENANCE LETTER (OUTPATIENT)
Age: 85
End: 2025-07-06

## 2025-07-08 DIAGNOSIS — D50.9 IRON DEFICIENCY ANEMIA, UNSPECIFIED IRON DEFICIENCY ANEMIA TYPE: Primary | ICD-10-CM

## 2025-07-09 RX ORDER — FERROUS SULFATE 325(65) MG
325 TABLET ORAL EVERY OTHER DAY
Qty: 45 TABLET | Refills: 2 | Status: SHIPPED | OUTPATIENT
Start: 2025-07-09

## 2025-07-09 NOTE — TELEPHONE ENCOUNTER
Clinic RN: Please investigate patient's chart or contact patient if the information cannot be found because the medication is listed as historical or discontinued. Confirm patient is taking this medication. Document findings and route refill encounter to provider for approval or denial.    Samuel Abreu RN on 7/9/2025 at 11:31 AM

## 2025-07-09 NOTE — TELEPHONE ENCOUNTER
Outgoing call to Patient.    Patient reports he has been taking the medication every other day per PCP's order.    Pharmacy confirmed.    Shreyas Devine RN [facility]

## 2025-08-17 ENCOUNTER — HEALTH MAINTENANCE LETTER (OUTPATIENT)
Age: 85
End: 2025-08-17